# Patient Record
Sex: FEMALE | Race: WHITE | NOT HISPANIC OR LATINO | URBAN - METROPOLITAN AREA
[De-identification: names, ages, dates, MRNs, and addresses within clinical notes are randomized per-mention and may not be internally consistent; named-entity substitution may affect disease eponyms.]

---

## 2017-01-10 ENCOUNTER — GENERIC CONVERSION - ENCOUNTER (OUTPATIENT)
Dept: OTHER | Facility: OTHER | Age: 52
End: 2017-01-10

## 2017-01-19 ENCOUNTER — ALLSCRIPTS OFFICE VISIT (OUTPATIENT)
Dept: OTHER | Facility: OTHER | Age: 52
End: 2017-01-19

## 2017-02-03 ENCOUNTER — GENERIC CONVERSION - ENCOUNTER (OUTPATIENT)
Dept: OTHER | Facility: OTHER | Age: 52
End: 2017-02-03

## 2017-02-24 ENCOUNTER — GENERIC CONVERSION - ENCOUNTER (OUTPATIENT)
Dept: OTHER | Facility: OTHER | Age: 52
End: 2017-02-24

## 2017-02-28 ENCOUNTER — GENERIC CONVERSION - ENCOUNTER (OUTPATIENT)
Dept: OTHER | Facility: OTHER | Age: 52
End: 2017-02-28

## 2017-03-02 ENCOUNTER — GENERIC CONVERSION - ENCOUNTER (OUTPATIENT)
Dept: OTHER | Facility: OTHER | Age: 52
End: 2017-03-02

## 2017-10-05 ENCOUNTER — ALLSCRIPTS OFFICE VISIT (OUTPATIENT)
Dept: OTHER | Facility: OTHER | Age: 52
End: 2017-10-05

## 2017-10-28 NOTE — PROGRESS NOTES
Assessment  1  Abdominal pain, epigastric (789 06) (R10 13)   2  Colorectal polyp detected on colonoscopy (211 3) (K63 5)    Plan  Abdominal pain, epigastric    · Avoid alcoholic beverages ; Status:Complete;   Done: 64HXQ3149   Ordered; For:Abdominal pain, epigastric; Ordered By:Claire Rodriguez;   · Avoid foods that give you gas ; Status:Complete;   Done: 75ZGK4786   Ordered; For:Abdominal pain, epigastric; Ordered By:Claire Rodriguez;   · Eat small frequent meals ; Status:Complete;   Done: 27ICH7612   Ordered;pain, epigastric; Ordered By:Claire Rodriguez;   · Raise the head of your bed to keep stomach acid from coming back up  ;Status:Complete;   Done: 78DRP9862   Ordered;pain, epigastric; Ordered By:Claire Rodriguez;   · Several things can be done to help treat and prevent your gastric reflux  ;Status:Complete;   Done: 28YFU8352   Ordered;pain, epigastric; Ordered By:Claire Rodriguez;   · Follow-up PRN Evaluation and Treatment  Follow-up  Status: Complete  Done:05Oct2017   Ordered; Abdominal pain, epigastric; Ordered By: Angie Fitch Performed:  Due: 22YFJ0577  Colorectal polyp detected on colonoscopy    · COLONOSCOPY (GI, SURG) ; every 5 months; Next 16LPY9979; Status:Active   For: 'Colorectal polyp detected on colonoscopy'Ordered By: Angie Fitch'    Discussion/Summary  Discussion Summary:     Epigastric pain, which appears resolved at this time  She does not seem to be having any significant symptoms of dyspepsia at this point, and has been off of PPI for about a year and a half now; last EGD also showed no remarkable findings  She had a couple of episodes of what she described as gas pain several weeks ago but these appear to have resolved, and I suspect these were dietary related and self-limited; I doubt for peptic ulcer disease at this point  History of adenomatous colon polyps; she had tubular adenomas removed during colonoscopy in 2015      No need to refill PPI at this time, I advised patient to call our office if she experiences any acid reflux, epigastric discomfort, etc   Patient was explained about the lifestyle and dietary modifications  Advised to avoid fatty foods, chocolates, caffeine, alcohol and any other triggering foods  Avoid eating for at least 3 hours before going to bed  Recommend repeat colonoscopy as early as March 2018; I placed reminder in [employer]  Counseling Documentation With Imm: The patient was counseled regarding diagnostic results,-- instructions for management,-- risk factor reductions,-- risks and benefits of treatment options,-- importance of compliance with treatment  Medication SE Review and Pt Understands Tx: Possible side effects of new medications were reviewed with the patient/guardian today  Chief Complaint  Chief Complaint Free Text Note Form: up; epigastric discomfort, reflux, history of colon polyps      History of Present Illness  HPI: female with history of anxiety presents for follow-up; she was seen by Dr Frankey Ina in December 2015 with complaints of epigastric discomfort, she underwent EGD which showed a 1-2 cm hiatal hernia and a few duodenal erosions, but biopsies were benign  Her last colonoscopy in March 2015 also showed some tubular adenomas and she was recommended for colonoscopy in 3 years from that time  says that she has been off of [address] [place] [address] [employer] for about a year and a half now and was doing well, she said that a few weeks ago she had an episode of what she thought was some severe gas pain after eating some corn, which eventually resolved on its own  She actually had 2 such episodes spaced apart by a few weeks, but has not had any recurrences since then  She says her bowel habits are regular, she denies diarrhea, blood or mucus in the stool  No nausea, her appetite is good  Denies any significant acid reflux or heartburn at this time  History Reviewed: The history was obtained today from the patient and I agree with the documented history  Review of Systems  Complete-Female GI Adult:  Constitutional: No fever, no chills, feels well, no tiredness, no recent weight gain or weight loss  Eyes: No complaints of eye pain, no red eyes, no eyesight problems, no discharge, no dry eyes, no itching of eyes  ENT: no complaints of earache, no loss of hearing, no nose bleeds, no nasal discharge, no sore throat, no hoarseness  Cardiovascular: No complaints of slow heart rate, no fast heart rate, no chest pain, no palpitations, no leg claudication, no lower extremity edema  Respiratory: No complaints of shortness of breath, no wheezing, no cough, no SOB on exertion, no orthopnea, no PND  Gastrointestinal: as noted in HPI  Genitourinary: No complaints of dysuria, no incontinence, no pelvic pain, no dysmenorrhea, no vaginal discharge or bleeding  Musculoskeletal: No complaints of arthralgias, no myalgias, no joint swelling or stiffness, no limb pain or swelling  Integumentary: No complaints of skin rash or lesions, no itching, no skin wounds, no breast pain or lump  Neurological: No complaints of headache, no confusion, no convulsions, no numbness, no dizziness or fainting, no tingling, no limb weakness, no difficulty walking  Psychiatric: Not suicidal, no sleep disturbance, no anxiety or depression, no change in personality, no emotional problems  Endocrine: No complaints of proptosis, no hot flashes, no muscle weakness, no deepening of the voice, no feelings of weakness  Hematologic/Lymphatic: No complaints of swollen glands, no swollen glands in the neck, does not bleed easily, does not bruise easily  Active Problems  1  Abdominal pain (789 00) (R10 9)   2  Abdominal pain, epigastric (789 06) (R10 13)   3  Acquired polycythemia (289 0) (D75 1)   4  Acute non-recurrent sinusitis, unspecified location (461 9) (J01 90)   5  Anxiety (300 00) (F41 9)   6  Carpal tunnel syndrome, unspecified laterality (354 0) (G56 00)   7   Colorectal polyp detected on colonoscopy (211 3) (K63 5)   8  Dyspepsia (536 8) (K30)   9  Greater trochanteric bursitis, right (726 5) (M70 61)   10  Hip pain, unspecified laterality   11  Ileus (560 1) (K56 7)   12  Lateral epicondylitis, unspecified laterality (726 32) (M77 10)   13  Nevus (216 9) (D22 9)   14  Pre-op exam (V72 84) (Z01 818)   15  Trigger middle finger of right hand (727 03) (M65 331)   16  Verruca (078 10) (B07 9)    Past Medical History  1  History of Pain of finger, unspecified laterality (729 5) (M79 646)   2  History of Upper respiratory infection (465 9) (J06 9)  Active Problems And Past Medical History Reviewed: The active problems and past medical history were reviewed and updated today  Surgical History  1  Denied: History of Reported Prior Surgical / Procedural History  Surgical History Reviewed: The surgical history was reviewed and updated today  Family History  Mother    1  Denied: Family history of Colon cancer   2  Denied: Family history of Crohn disease   3  Denied: Family history of Liver disease  Father    4  Denied: Family history of Colon cancer   5  Denied: Family history of Crohn disease   6  Denied: Family history of Liver disease  Family History Reviewed: The family history was reviewed and updated today  Social History     · Current every day smoker (305 1) (F17 200)   · Social alcohol use (Z78 9)  Social History Reviewed: The social history was reviewed and updated today  The social history was reviewed and is unchanged  Current Meds   1  Dexilant 60 MG Oral Capsule Delayed Release; TAKE 1 CAPSULE DAILY EVERY MORNING BEFORE BREAKFAST; Therapy: 27JNR3473 to (Evaluate:12Oct2017)  Requested for: 12Sep2017; Last Rx:00Bmi1547 Ordered   2  LORazepam 1 MG Oral Tablet; TAKE 1/2 TO 1 TABLET AS NEEDED FOR ANXIETY; Therapy: 79OQX6159 to (Evaluate:04Oct2017); Last Rx:33Jqp1860 Ordered   3  PredniSONE 10 MG Oral Tablet; 1 PO BID FOR 3 DAYS WITH FOOD;  Therapy: 79MYL0822 to (Evaluate:22Jan2017)  Requested for: 64YLF5246; Last Rx:19Jan2017 Ordered   4  Premarin 0 625 MG/GM Vaginal Cream; Therapy: 53JPV7095 to Recorded  Medication List Reviewed: The medication list was reviewed and updated today  Allergies  1  No Known Drug Allergies    Vitals  Vital Signs    Recorded: 96MYI8711 01:46PM   Temperature 98 1 F   Heart Rate 89   Respiration 14   Systolic 751   Diastolic 78   Height 5 ft 2 in   Weight 142 lb    BMI Calculated 25 97   BSA Calculated 1 65   O2 Saturation 97       Physical Exam   Constitutional  General appearance: No acute distress, well appearing and well nourished  Eyes  Conjunctiva and lids: No swelling, erythema or discharge  Pupils and irises: Equal, round and reactive to light  Ears, Nose, Mouth, and Throat  External inspection of ears and nose: Normal    Oropharynx: Normal with no erythema, edema, exudate or lesions  Pulmonary  Respiratory effort: No increased work of breathing or signs of respiratory distress  Auscultation of lungs: Clear to auscultation  Cardiovascular  Palpation of heart: Normal PMI, no thrills  Auscultation of heart: Normal rate and rhythm, normal S1 and S2, without murmurs  Examination of extremities for edema and/or varicosities: Normal    Carotid pulses: Normal    Abdomen  Abdomen: Non-tender, no masses  Liver and spleen: No hepatomegaly or splenomegaly  Lymphatic  Palpation of lymph nodes in neck: No lymphadenopathy  Musculoskeletal  Gait and station: Normal    Digits and nails: Normal without clubbing or cyanosis  Inspection/palpation of joints, bones, and muscles: Normal    Skin  Skin and subcutaneous tissue: Normal without rashes or lesions  Psychiatric  Orientation to person, place, and time: Normal    Mood and affect: Normal          Health Management  Abdominal pain, epigastric   COLONOSCOPY; every 3 years; Last 94DCE5518; Next Due: [id number];  Active  Colorectal polyp detected on colonoscopy   COLONOSCOPY; every 3 years; Last 63EMW6332; Next Due: [id number];  Active    Signatures   Electronically signed by : Leretha Litten, [facility]; Oct  5 2017  4:12PM EST                       (Author)    Electronically signed by : JALEN Hernandez ; Oct  6 2017  6:39PM EST                       (Author)

## 2017-11-10 ENCOUNTER — ALLSCRIPTS OFFICE VISIT (OUTPATIENT)
Dept: OTHER | Facility: OTHER | Age: 52
End: 2017-11-10

## 2017-11-11 NOTE — PROCEDURES
Chief Complaint  trigger finger left middle finger here for steroid injection  history of right middle finger trigger finger with steroid injection 11/2015      Current Meds   1  LORazepam 1 MG Oral Tablet; TAKE 1/2 TO 1 TABLET AS NEEDED FOR ANXIETY; Therapy: 45HHF0657 to (Evaluate:04Oct2017); Last Rx:70Vzi4560 Ordered   2  Melatonin 5 MG Oral Capsule; Therapy: (Recorded:10Nov2017) to Recorded   3  Premarin 0 625 MG/GM Vaginal Cream; Therapy: 20XVF7291 to Recorded   4  Skelaxin 800 MG Oral Tablet; Therapy: (Recorded:10Nov2017) to Recorded    Allergies  1  No Known Drug Allergies    Vitals  Signs     Temperature: 99 7 F  Heart Rate: 76  Respiration: 16  Systolic: 164  Diastolic: 78  Height: 5 ft 2 in  Weight: 142 lb   BMI Calculated: 25 97  BSA Calculated: 1 65    Procedure    Procedure: Injection of the tendon sheathflexor tendon sheath of the left 3rd finger  Indication: trigger finger  Risk, benefits, alternatives, bleeding risk, infection risk and allergic reaction risk were discussed with the  Verbal consent was obtained prior to the procedure  Preparation: alcohol was used to prep the area  Procedure Note: A 25-gauge was used to inject 5/8-- and-- 1 mL 40mg/mL methylprednisolone  A bandage was applied  Post-Procedure: the patient tolerated the procedure well  Complications: None  Patient instructed to avoid strenuous activity for 1 day(s)  Follow-up in the office as needed  Assessment    1  Trigger middle finger of left hand (727 03) (M65 332)    Plan  Anxiety    · LORazepam 1 MG Oral Tablet; TAKE 1/2 TO 1 TABLET AS NEEDED FORANXIETY  PMH: History of lateral epicondylitis    · Metaxalone 800 MG Oral Tablet; TAKE 1 TABLET 3-4 TIMES DAILY AS NEEDED    Discussion/Summary    Recheck as needed        Signatures   Electronically signed by : JALEN Lee ; Nov 10 2017  2:03PM EST                       (Author)

## 2018-01-13 VITALS
HEIGHT: 62 IN | WEIGHT: 142.4 LBS | RESPIRATION RATE: 16 BRPM | TEMPERATURE: 98.5 F | SYSTOLIC BLOOD PRESSURE: 124 MMHG | HEART RATE: 92 BPM | DIASTOLIC BLOOD PRESSURE: 80 MMHG | BODY MASS INDEX: 26.2 KG/M2

## 2018-01-14 VITALS
HEART RATE: 76 BPM | BODY MASS INDEX: 26.13 KG/M2 | SYSTOLIC BLOOD PRESSURE: 124 MMHG | DIASTOLIC BLOOD PRESSURE: 78 MMHG | HEIGHT: 62 IN | WEIGHT: 142 LBS | RESPIRATION RATE: 16 BRPM | TEMPERATURE: 99.7 F

## 2018-01-14 VITALS
RESPIRATION RATE: 14 BRPM | HEART RATE: 89 BPM | WEIGHT: 142 LBS | HEIGHT: 62 IN | BODY MASS INDEX: 26.13 KG/M2 | SYSTOLIC BLOOD PRESSURE: 122 MMHG | DIASTOLIC BLOOD PRESSURE: 78 MMHG | OXYGEN SATURATION: 97 % | TEMPERATURE: 98.1 F

## 2018-03-12 DIAGNOSIS — F41.9 ANXIETY: Primary | ICD-10-CM

## 2018-03-12 RX ORDER — LORAZEPAM 1 MG/1
.5-1 TABLET ORAL DAILY
COMMUNITY
Start: 2012-10-10 | End: 2018-03-12 | Stop reason: SDUPTHER

## 2018-03-12 RX ORDER — LORAZEPAM 1 MG/1
TABLET ORAL
Qty: 30 TABLET | Refills: 2 | Status: SHIPPED | OUTPATIENT
Start: 2018-03-12 | End: 2018-04-16

## 2018-03-12 NOTE — TELEPHONE ENCOUNTER
Patient requesting refill on Lorazepam 1 mg  1 pill as needed #30  Patient needs to sign a controlled substance letter, Call when ready to

## 2018-04-16 ENCOUNTER — OFFICE VISIT (OUTPATIENT)
Dept: FAMILY MEDICINE CLINIC | Facility: CLINIC | Age: 53
End: 2018-04-16
Payer: COMMERCIAL

## 2018-04-16 VITALS
WEIGHT: 149.2 LBS | SYSTOLIC BLOOD PRESSURE: 110 MMHG | HEART RATE: 70 BPM | TEMPERATURE: 98.4 F | HEIGHT: 62 IN | DIASTOLIC BLOOD PRESSURE: 78 MMHG | BODY MASS INDEX: 27.46 KG/M2

## 2018-04-16 DIAGNOSIS — M79.674 PAIN OF TOE OF RIGHT FOOT: ICD-10-CM

## 2018-04-16 DIAGNOSIS — M54.50 ACUTE LEFT-SIDED LOW BACK PAIN WITHOUT SCIATICA: Primary | ICD-10-CM

## 2018-04-16 PROCEDURE — 99214 OFFICE O/P EST MOD 30 MIN: CPT | Performed by: FAMILY MEDICINE

## 2018-04-16 RX ORDER — DIPHENOXYLATE HYDROCHLORIDE AND ATROPINE SULFATE 2.5; .025 MG/1; MG/1
1 TABLET ORAL
COMMUNITY

## 2018-04-16 RX ORDER — IBUPROFEN 600 MG/1
600 TABLET ORAL EVERY 8 HOURS PRN
Qty: 30 TABLET | Refills: 0 | Status: SHIPPED | OUTPATIENT
Start: 2018-04-16 | End: 2020-07-02 | Stop reason: ALTCHOICE

## 2018-04-16 RX ORDER — ASCORBATE CALCIUM 500 MG
500 TABLET ORAL
COMMUNITY
End: 2020-01-06 | Stop reason: ALTCHOICE

## 2018-04-16 RX ORDER — LORAZEPAM 1 MG/1
1 TABLET ORAL DAILY PRN
COMMUNITY
Start: 2018-01-08 | End: 2018-06-27 | Stop reason: SDUPTHER

## 2018-04-16 RX ORDER — METAXALONE 800 MG/1
1 TABLET ORAL DAILY PRN
COMMUNITY
End: 2018-07-13 | Stop reason: ALTCHOICE

## 2018-04-16 NOTE — PROGRESS NOTES
FAMILY MEDICINE PROGRESS NOTE  Holden Leung 45 y o  female   DATE: April 16, 2018 TIME: 8:23 AM      ASSESSMENT and PLAN:  Holden Leung is a 45 y o  female with:     1  Acute left-sided low back pain without sciatica  Likely musculoskeletal in origin, usually improves with supportive care, encouraged heat to the area, stretches  Has stomach pain with Naproxen, recommended doing H2 blocker with higher dose of Ibuprofen  Mildly positive SLR bilaterally, but likely due to acute inflammation versus disc herniation  If symptoms persist, will get Xray and start PT  - ibuprofen (MOTRIN) 600 mg tablet; Take 1 tablet (600 mg total) by mouth every 8 (eight) hours as needed for mild pain  Dispense: 30 tablet; Refill: 0    2  Pain of toe of right foot  Likely a bone spur versus calcification, not having any pain, reassured, advised that if pain recurs, can get X-ray, but will likely would not need intervention  SUBJECTIVE:  Holden Leung is a 45 y o  female who presents today with a chief complaint of Toe Pain (injury started 2016 when she jumped off a fence  right pinky toe hurt on and off for about a year  now she has no feeling in her toe) and Back Pain (puuled lower back yesterday)  In the fall of 2016, pt was picking apples and jumped off a fence in [place]  It hut at the time and never sought treatment, and on and  Then 3-4 months ago she started noticing a lump on the side of the toe, then 2-3 months ago it started feeling like a rubber band around the toe and now she cant move it at all  Denies numbness/tingling  Not really having pain in the toe, mostly on the side of the toe  Also, yesterday she thinks she pulled a muscle in her lower back, more on the left side and right after she was sitting on the toilet and tried to grab something and pulled a muscle she thinks  Usually clears up if she takes the Skelaxin      Toe Pain    The incident occurred more than 1 week ago   The injury mechanism was a fall  The pain is present in the right toes  Associated symptoms include a loss of motion  Pertinent negatives include no inability to bear weight, loss of sensation, muscle weakness, numbness or tingling  Nothing aggravates the symptoms  The treatment provided no relief  Back Pain   Pertinent negatives include no numbness or tingling  Review of Systems   Musculoskeletal: Positive for back pain  Neurological: Negative for tingling and numbness  I have reviewed the patient's PMH, Social History, Medication List and Allergies  OBJECTIVE:  /78 (BP Location: Left arm, Patient Position: Sitting)   Pulse 70   Temp 98 4 °F (36 9 °C)   Ht 5' 2" (1 575 m)   Wt 67 7 kg (149 lb 3 2 oz)   BMI 27 29 kg/m²   Physical Exam   Constitutional: She appears well-developed and well-nourished  No distress  Musculoskeletal:        Lumbar back: She exhibits tenderness and pain  She exhibits normal range of motion, no bony tenderness, no swelling, no edema, no deformity, no laceration, no spasm and normal pulse  Back:         Feet:    Mildly positive SLR test bilaterally   Skin: She is not diaphoretic  Levy Herrera MD

## 2018-06-27 DIAGNOSIS — F41.1 GAD (GENERALIZED ANXIETY DISORDER): Primary | ICD-10-CM

## 2018-06-28 RX ORDER — LORAZEPAM 1 MG/1
1 TABLET ORAL DAILY PRN
Qty: 30 TABLET | Refills: 2 | Status: SHIPPED | OUTPATIENT
Start: 2018-06-28 | End: 2018-07-05 | Stop reason: SDUPTHER

## 2018-07-05 ENCOUNTER — TELEPHONE (OUTPATIENT)
Dept: FAMILY MEDICINE CLINIC | Facility: CLINIC | Age: 53
End: 2018-07-05

## 2018-07-05 ENCOUNTER — OFFICE VISIT (OUTPATIENT)
Dept: FAMILY MEDICINE CLINIC | Facility: CLINIC | Age: 53
End: 2018-07-05
Payer: COMMERCIAL

## 2018-07-05 VITALS
WEIGHT: 151.2 LBS | BODY MASS INDEX: 28.55 KG/M2 | TEMPERATURE: 97.6 F | HEART RATE: 100 BPM | RESPIRATION RATE: 16 BRPM | SYSTOLIC BLOOD PRESSURE: 138 MMHG | DIASTOLIC BLOOD PRESSURE: 84 MMHG | HEIGHT: 61 IN

## 2018-07-05 DIAGNOSIS — F41.1 GAD (GENERALIZED ANXIETY DISORDER): ICD-10-CM

## 2018-07-05 DIAGNOSIS — Z00.00 WELL ADULT EXAM: Primary | ICD-10-CM

## 2018-07-05 DIAGNOSIS — M79.18 MYOFASCIAL PAIN SYNDROME: ICD-10-CM

## 2018-07-05 DIAGNOSIS — M65.332 TRIGGER MIDDLE FINGER OF LEFT HAND: ICD-10-CM

## 2018-07-05 PROCEDURE — 99396 PREV VISIT EST AGE 40-64: CPT | Performed by: FAMILY MEDICINE

## 2018-07-05 PROCEDURE — 20550 NJX 1 TENDON SHEATH/LIGAMENT: CPT | Performed by: FAMILY MEDICINE

## 2018-07-05 RX ORDER — LORAZEPAM 1 MG/1
1 TABLET ORAL DAILY PRN
Qty: 30 TABLET | Refills: 2 | Status: SHIPPED | OUTPATIENT
Start: 2018-07-05 | End: 2018-11-13 | Stop reason: SDUPTHER

## 2018-07-05 RX ORDER — METHYLPREDNISOLONE ACETATE 80 MG/ML
40 INJECTION, SUSPENSION INTRA-ARTICULAR; INTRALESIONAL; INTRAMUSCULAR; SOFT TISSUE ONCE
Status: COMPLETED | OUTPATIENT
Start: 2018-07-05 | End: 2018-07-05

## 2018-07-05 RX ADMIN — METHYLPREDNISOLONE ACETATE 40 MG: 80 INJECTION, SUSPENSION INTRA-ARTICULAR; INTRALESIONAL; INTRAMUSCULAR; SOFT TISSUE at 17:12

## 2018-07-05 NOTE — TELEPHONE ENCOUNTER
Received fax from [facility] stating that patient needed a prior-auth for Metaxalone  Patient was given initial script for medication on 8/17/2017 with 1 refill  She called in for refill and has changed insurance and now requires prior-auth  Arleth Schwartz

## 2018-07-05 NOTE — PROGRESS NOTES
Assessment/Plan:     Diagnoses and all orders for this visit:    Well adult exam    Trigger middle finger of left hand  -     methylPREDNISolone acetate (DEPO-MEDROL) injection 40 mg; 0 5 mL (40 mg total) by Intra-lesional route once     Myofascial pain syndrome  -     Discontinue: diclofenac sodium (VOLTAREN) 1 %; Apply 2 g topically 4 (four) times a day  -     diclofenac sodium (VOLTAREN) 1 %; Apply 2 g topically 4 (four) times a day    TEQUILA (generalized anxiety disorder)  -     LORazepam (ATIVAN) 1 mg tablet; Take 1 tablet (1 mg total) by mouth daily as needed for anxiety        Continue with current medications  She is not interested in labs  As a separate procedure today I performed a trigger finger injection see procedure note  Patient ID: Rahul Greene is a 45 y o  female  45year old female here for wellness visit  Active problems and PMH see chart  Medications reviewed  No recent labs  yearly mammogram  Pap smear 02/2017  She is scheduled for colonoscopy  Smoker < 1/2 PPD  She walks on a regular basis  The following portions of the patient's history were reviewed and updated as appropriate: allergies, current medications, past family history, past medical history, past social history, past surgical history and problem list     Review of Systems   Constitutional: Negative for appetite change, chills, fatigue, fever and unexpected weight change  HENT: Negative for congestion, ear pain, rhinorrhea, sore throat, trouble swallowing and voice change  Eyes: Negative for visual disturbance  Respiratory: Negative for cough, shortness of breath and wheezing  Cardiovascular: Negative for chest pain, palpitations and leg swelling  EKG 01/2017   Gastrointestinal: Negative for abdominal pain, blood in stool, constipation, diarrhea, nausea and vomiting         02/2015 EGD [facility]  03/2015 colonoscopy benign polyps  Genitourinary: Negative for difficulty urinating     Musculoskeletal: Positive for back pain and myalgias  Negative for joint swelling  Recurrent upper and lower back pain  She has been using prn Voltaren Gel and Skelaxin with improvement  No relief with OTC NSAIDs  Symptomatic trigger finger left middle finger  Skin: Negative for rash  Neurological: Negative for dizziness and headaches  Hematological: Negative for adenopathy  Does not bruise/bleed easily  Psychiatric/Behavioral: Negative for dysphoric mood and sleep disturbance  Objective:      /84 (BP Location: Left arm, Patient Position: Sitting, Cuff Size: Large)   Pulse 100   Temp 97 6 °F (36 4 °C) (Tympanic)   Resp 16   Ht 5' 1 25" (1 556 m)   Wt 68 6 kg (151 lb 3 2 oz)   BMI 28 34 kg/m²          Physical Exam   Constitutional: She is oriented to person, place, and time  She appears well-developed and well-nourished  HENT:   Right Ear: Tympanic membrane normal    Left Ear: Tympanic membrane normal    Mouth/Throat: Oropharynx is clear and moist and mucous membranes are normal  No oral lesions  Normal dentition  Eyes: Conjunctivae and EOM are normal  Pupils are equal, round, and reactive to light  No scleral icterus  Neck: No JVD present  No tracheal deviation present  No thyroid mass and no thyromegaly present  Cardiovascular: Normal rate, regular rhythm and normal heart sounds  Exam reveals no gallop  No murmur heard  Pulmonary/Chest: Effort normal and breath sounds normal  No respiratory distress  She has no wheezes  She has no rales  Abdominal: Soft  Bowel sounds are normal  She exhibits no distension and no mass  There is no tenderness  There is no rebound and no guarding  Musculoskeletal: Normal range of motion  She exhibits no edema  Tenderness base of left middle finger on palmar surface  Lymphadenopathy:     She has no cervical adenopathy  Neurological: She is alert and oriented to person, place, and time  No cranial nerve deficit  Skin: No rash noted  Psychiatric: She has a normal mood and affect  Nursing note and vitals reviewed  Procedure Trigger finger injection left middle finger  Using aseptic technique a trigger finger steroid injection was performed  left middle finger palmar surface at base of middle finger just below left DIP joint  DepoMedrol 80 mg/ML 0 5 ML       patient tolerated procedure  No complications

## 2018-07-06 ENCOUNTER — TELEPHONE (OUTPATIENT)
Dept: FAMILY MEDICINE CLINIC | Facility: CLINIC | Age: 53
End: 2018-07-06

## 2018-07-11 ENCOUNTER — TELEPHONE (OUTPATIENT)
Dept: FAMILY MEDICINE CLINIC | Facility: CLINIC | Age: 53
End: 2018-07-11

## 2018-07-11 NOTE — TELEPHONE ENCOUNTER
[facility] Metaxalone 800 mg needs prior auth   [phone] [id number]

## 2018-07-12 NOTE — TELEPHONE ENCOUNTER
[facility] denied request to continue covering skelaxin  They want documentation that pt has tried PT  Or other measures for her pain

## 2018-07-13 DIAGNOSIS — M79.18 MYOFASCIAL PAIN SYNDROME: Primary | ICD-10-CM

## 2018-07-13 RX ORDER — CYCLOBENZAPRINE HCL 10 MG
TABLET ORAL
Qty: 30 TABLET | Refills: 2 | Status: SHIPPED | OUTPATIENT
Start: 2018-07-13 | End: 2020-01-06 | Stop reason: ALTCHOICE

## 2018-11-13 DIAGNOSIS — F41.1 GAD (GENERALIZED ANXIETY DISORDER): ICD-10-CM

## 2018-11-14 RX ORDER — LORAZEPAM 1 MG/1
1 TABLET ORAL DAILY PRN
Qty: 30 TABLET | Refills: 2 | Status: SHIPPED | OUTPATIENT
Start: 2018-11-14 | End: 2019-02-28 | Stop reason: SDUPTHER

## 2018-12-11 ENCOUNTER — OFFICE VISIT (OUTPATIENT)
Dept: FAMILY MEDICINE CLINIC | Facility: CLINIC | Age: 53
End: 2018-12-11
Payer: COMMERCIAL

## 2018-12-11 VITALS
WEIGHT: 151 LBS | TEMPERATURE: 98.5 F | SYSTOLIC BLOOD PRESSURE: 148 MMHG | BODY MASS INDEX: 27.79 KG/M2 | HEIGHT: 62 IN | DIASTOLIC BLOOD PRESSURE: 82 MMHG

## 2018-12-11 DIAGNOSIS — M77.01 MEDIAL EPICONDYLITIS OF RIGHT ELBOW: Primary | ICD-10-CM

## 2018-12-11 DIAGNOSIS — G56.01 CARPAL TUNNEL SYNDROME OF RIGHT WRIST: ICD-10-CM

## 2018-12-11 PROCEDURE — 3008F BODY MASS INDEX DOCD: CPT | Performed by: FAMILY MEDICINE

## 2018-12-11 PROCEDURE — 20550 NJX 1 TENDON SHEATH/LIGAMENT: CPT | Performed by: FAMILY MEDICINE

## 2018-12-11 PROCEDURE — 99213 OFFICE O/P EST LOW 20 MIN: CPT | Performed by: FAMILY MEDICINE

## 2018-12-11 RX ORDER — METHYLPREDNISOLONE ACETATE 80 MG/ML
40 INJECTION, SUSPENSION INTRA-ARTICULAR; INTRALESIONAL; INTRAMUSCULAR; SOFT TISSUE ONCE
Status: COMPLETED | OUTPATIENT
Start: 2018-12-11 | End: 2018-12-11

## 2018-12-11 RX ADMIN — METHYLPREDNISOLONE ACETATE 40 MG: 80 INJECTION, SUSPENSION INTRA-ARTICULAR; INTRALESIONAL; INTRAMUSCULAR; SOFT TISSUE at 17:02

## 2018-12-11 NOTE — PROGRESS NOTES
Assessment/Plan:     Diagnoses and all orders for this visit:    Medial epicondylitis of right elbow  -     methylPREDNISolone acetate (DEPO-MEDROL) injection 40 mg; Inject 0 5 mL (40 mg total) into the joint once     Carpal tunnel syndrome of right wrist        Suspected CTS right wrist  Trial of wrist splint at   Consider EMGs for persistent symptoms  As a separate procedure today I performed a tendon sheath injection right medial epicondyle area see procedure note  Patient ID: Rhina Mancia is a 52 y o  female  Recurrent right medial elbow pain  Right handed  No trauma or injury  No improvement with Advil, CBD oil and TENS unit  No other joint symptoms  Current medications reviewed  The following portions of the patient's history were reviewed and updated as appropriate: allergies, current medications, past family history, past medical history, past social history, past surgical history and problem list     Review of Systems   Constitutional: Negative for chills and fever  Musculoskeletal: Negative for neck pain  See HPI   Skin: Negative for rash  Allergic/Immunologic:        Intermittent numbness right hand involving palm right  hand worse with certain positions  Neurological: Positive for numbness  Negative for dizziness, weakness and headaches  Objective:      /82 (BP Location: Left arm, Patient Position: Sitting, Cuff Size: Standard)   Temp 98 5 °F (36 9 °C)   Ht 5' 2" (1 575 m)   Wt 68 5 kg (151 lb)   BMI 27 62 kg/m²          Physical Exam   Constitutional: No distress  Musculoskeletal:        Right elbow: She exhibits normal range of motion, no swelling, no effusion and no deformity  Tenderness found  Medial epicondyle tenderness noted  No radial head, no lateral epicondyle and no olecranon process tenderness noted  Neurological: She has normal reflexes  No sensory deficit  +/- Tinel's sign on right  No muscle wasting   Right hand  normal  Procedure note steroid injection tendon sheath    Indication right medial epicondylitis    Using aseptic technique right medial epicondyle injected with DepoMedrol 80 mg/ML 0 5 ML and Lidocaine 1% 1 ML  Patient tolerated procedure  No complications

## 2019-02-27 ENCOUNTER — TELEPHONE (OUTPATIENT)
Dept: FAMILY MEDICINE CLINIC | Facility: CLINIC | Age: 54
End: 2019-02-27

## 2019-02-27 DIAGNOSIS — G89.29 CHRONIC LEFT SHOULDER PAIN: Primary | ICD-10-CM

## 2019-02-27 DIAGNOSIS — M25.512 CHRONIC LEFT SHOULDER PAIN: Primary | ICD-10-CM

## 2019-02-27 DIAGNOSIS — F41.1 GAD (GENERALIZED ANXIETY DISORDER): ICD-10-CM

## 2019-02-27 RX ORDER — LORAZEPAM 1 MG/1
1 TABLET ORAL DAILY PRN
Qty: 30 TABLET | Refills: 2 | Status: CANCELLED | OUTPATIENT
Start: 2019-02-27

## 2019-02-27 RX ORDER — METAXALONE 800 MG/1
800 TABLET ORAL 3 TIMES DAILY PRN
Qty: 30 TABLET | Refills: 0 | Status: SHIPPED | OUTPATIENT
Start: 2019-02-27 | End: 2022-02-07

## 2019-02-27 NOTE — TELEPHONE ENCOUNTER
Patient requesting Lorazepam 1 mg tablet, 1 pill daily sent to [facility]  Last office visit 12/11/18  [place] PDMP website checked, last fill on 01/30/19

## 2019-02-28 DIAGNOSIS — F41.1 GAD (GENERALIZED ANXIETY DISORDER): ICD-10-CM

## 2019-02-28 RX ORDER — LORAZEPAM 1 MG/1
1 TABLET ORAL DAILY PRN
Qty: 30 TABLET | Refills: 2 | Status: SHIPPED | OUTPATIENT
Start: 2019-02-28 | End: 2019-08-27 | Stop reason: SDUPTHER

## 2019-05-22 ENCOUNTER — OFFICE VISIT (OUTPATIENT)
Dept: FAMILY MEDICINE CLINIC | Facility: CLINIC | Age: 54
End: 2019-05-22
Payer: COMMERCIAL

## 2019-05-22 VITALS
HEIGHT: 62 IN | WEIGHT: 148 LBS | HEART RATE: 68 BPM | RESPIRATION RATE: 16 BRPM | TEMPERATURE: 97.2 F | BODY MASS INDEX: 27.23 KG/M2 | SYSTOLIC BLOOD PRESSURE: 142 MMHG | DIASTOLIC BLOOD PRESSURE: 82 MMHG

## 2019-05-22 DIAGNOSIS — M77.01 MEDIAL EPICONDYLITIS OF ELBOW, RIGHT: Primary | ICD-10-CM

## 2019-05-22 DIAGNOSIS — M65.352 TRIGGER LITTLE FINGER OF LEFT HAND: ICD-10-CM

## 2019-05-22 PROCEDURE — 20550 NJX 1 TENDON SHEATH/LIGAMENT: CPT | Performed by: FAMILY MEDICINE

## 2019-05-22 RX ORDER — METHYLPREDNISOLONE ACETATE 80 MG/ML
40 INJECTION, SUSPENSION INTRA-ARTICULAR; INTRALESIONAL; INTRAMUSCULAR; SOFT TISSUE ONCE
Status: COMPLETED | OUTPATIENT
Start: 2019-05-22 | End: 2019-05-22

## 2019-05-22 RX ORDER — METHYLPREDNISOLONE ACETATE 80 MG/ML
80 INJECTION, SUSPENSION INTRA-ARTICULAR; INTRALESIONAL; INTRAMUSCULAR; SOFT TISSUE ONCE
Status: COMPLETED | OUTPATIENT
Start: 2019-05-22 | End: 2019-05-22

## 2019-05-22 RX ADMIN — METHYLPREDNISOLONE ACETATE 40 MG: 80 INJECTION, SUSPENSION INTRA-ARTICULAR; INTRALESIONAL; INTRAMUSCULAR; SOFT TISSUE at 16:18

## 2019-05-22 RX ADMIN — METHYLPREDNISOLONE ACETATE 80 MG: 80 INJECTION, SUSPENSION INTRA-ARTICULAR; INTRALESIONAL; INTRAMUSCULAR; SOFT TISSUE at 16:19

## 2019-08-27 DIAGNOSIS — F41.1 GAD (GENERALIZED ANXIETY DISORDER): ICD-10-CM

## 2019-08-27 RX ORDER — LORAZEPAM 1 MG/1
1 TABLET ORAL DAILY PRN
Qty: 30 TABLET | Refills: 2 | Status: SHIPPED | OUTPATIENT
Start: 2019-08-27 | End: 2019-11-19 | Stop reason: SDUPTHER

## 2019-08-27 NOTE — TELEPHONE ENCOUNTER
Refill request for Lorazepam 1 mg, 1 tab as needed for anxiety  [facility]

## 2019-11-19 DIAGNOSIS — F41.1 GAD (GENERALIZED ANXIETY DISORDER): ICD-10-CM

## 2019-11-19 RX ORDER — LORAZEPAM 1 MG/1
1 TABLET ORAL DAILY PRN
Qty: 30 TABLET | Refills: 2 | Status: SHIPPED | OUTPATIENT
Start: 2019-11-26 | End: 2020-03-09 | Stop reason: SDUPTHER

## 2019-11-19 NOTE — TELEPHONE ENCOUNTER
Patient called requesting refill on Lorazepam 1 mg 30 tabs with refills  [facility]  Patient needs Controlled Substance letter signed   Last Office visit 05-

## 2020-01-06 ENCOUNTER — OFFICE VISIT (OUTPATIENT)
Dept: FAMILY MEDICINE CLINIC | Facility: CLINIC | Age: 55
End: 2020-01-06
Payer: COMMERCIAL

## 2020-01-06 VITALS
DIASTOLIC BLOOD PRESSURE: 70 MMHG | TEMPERATURE: 98.1 F | BODY MASS INDEX: 23.07 KG/M2 | HEART RATE: 72 BPM | WEIGHT: 147 LBS | RESPIRATION RATE: 16 BRPM | SYSTOLIC BLOOD PRESSURE: 118 MMHG | HEIGHT: 67 IN

## 2020-01-06 DIAGNOSIS — N81.4 UTEROVAGINAL PROLAPSE: ICD-10-CM

## 2020-01-06 DIAGNOSIS — N81.11 MIDLINE CYSTOCELE: ICD-10-CM

## 2020-01-06 DIAGNOSIS — Z72.0 TOBACCO USE: ICD-10-CM

## 2020-01-06 DIAGNOSIS — D75.1 ACQUIRED POLYCYTHEMIA: ICD-10-CM

## 2020-01-06 DIAGNOSIS — Z01.818 PREOPERATIVE CLEARANCE: Primary | ICD-10-CM

## 2020-01-06 PROBLEM — N81.6 RECTOCELE: Status: ACTIVE | Noted: 2019-08-16

## 2020-01-06 PROCEDURE — 99214 OFFICE O/P EST MOD 30 MIN: CPT | Performed by: FAMILY MEDICINE

## 2020-01-06 PROCEDURE — 3008F BODY MASS INDEX DOCD: CPT | Performed by: FAMILY MEDICINE

## 2020-01-06 NOTE — PROGRESS NOTES
PRE-OPERATIVE EVALUATION  [facility]    NAME: Dominga Rose  AGE: 62 y o  SEX: female  : 1965     DATE: 2020    Family Medicine Pre-Operative Evaluation      Chief Complaint: Uterine prolapse  Surgery: vaginal hysterectomy, bilateral salpingectomy, A/P repair and uterosacral ligament suspension  Anticipated Date of Surgery: 2020  Referring Provider:      History of Present Illness: Dmoinga Rose is a 62 y o  female who presents to the office today for a preoperative consultation at the request of the surgeon for the procedure above  Current anti-platelet/anti-coagulation medications that the patient is prescribed includes: none  Assessment of Cardiac Risk:  · Denies unstable or severe angina or MI in the last 6 weeks or history of stent placement in the last year   · Denies decompensated heart failure (e g  New onset heart failure, NYHA functional class IV heart failure, or worsening existing heart failure)  · Denies significant arrhythmias such as high grade AV block, symptomatic ventricular arrhythmia, newly recognized ventricular tachycardia, supraventricular tachycardia with resting heart rate >100, or symptomatic bradycardia  · Denies severe heart valve disease including aortic stenosis or symptomatic mitral stenosis     Exercise Capacity:  · Able to walk 4 blocks without symptoms?: Yes,     · Able to walk 2 flights without symptoms?: Yes    Prior Anesthesia Reactions: No     Personal history of venous thromboembolic disease? No    Review of Systems:     Review of Systems   Constitutional: Negative for appetite change, chills, fever and unexpected weight change  HENT: Negative for congestion, rhinorrhea, sore throat and trouble swallowing  Respiratory: Negative for cough, shortness of breath and wheezing  Smoker < 1 PPD   Cardiovascular: Negative for chest pain, palpitations and leg swelling  Pre admission EKG NSR  No acute changes  Gastrointestinal: Negative for abdominal pain, blood in stool, constipation, diarrhea, nausea, rectal pain and vomiting  Genitourinary: Negative for difficulty urinating, dysuria and hematuria  See HPI  Uterine prolapse with prior pessary use  + urinary frequency and urgency  Occasional urinary incontinence  Pre admission urine culture normal  Pap smear 03/2019    Hematological: Does not bruise/bleed easily  Pre admission CBC WBC 7700  Hemoglobin 14 9  MCV 99  Platelet count 098117   Psychiatric/Behavioral: Negative for dysphoric mood  The patient is nervous/anxious  Patient uses p r n   Ativan     Current Problem List:     Patient Active Problem List   Diagnosis    Acquired polycythemia    Anxiety    Colorectal polyp detected on colonoscopy    Nevus    Midline cystocele    Rectocele    Uterovaginal prolapse     Allergies:     No Known Allergies  Medications:       Current Outpatient Medications:     cannabidiol (EPIDIOLEX) 100 mg/ml SOLN, Take by mouth 2 (two) times a day, Disp: , Rfl:     Cholecalciferol 1000 units capsule, Take 1,000 Units by mouth, Disp: , Rfl:     conjugated estrogens (PREMARIN) vaginal cream, Insert into the vagina, Disp: , Rfl:     diclofenac sodium (VOLTAREN) 1 %, Apply 2 g topically 4 (four) times a day, Disp: 100 g, Rfl: 3    ibuprofen (MOTRIN) 600 mg tablet, Take 1 tablet (600 mg total) by mouth every 8 (eight) hours as needed for mild pain, Disp: 30 tablet, Rfl: 0    LORazepam (ATIVAN) 1 mg tablet, Take 1 tablet (1 mg total) by mouth daily as needed for anxiety, Disp: 30 tablet, Rfl: 2    Melatonin 5 MG CAPS, Take by mouth, Disp: , Rfl:     metaxalone (SKELAXIN) 800 mg tablet, Take 1 tablet (800 mg total) by mouth 3 (three) times a day as needed for muscle spasms for up to 10 days, Disp: 30 tablet, Rfl: 0    multivitamin (THERAGRAN) TABS, Take 1 tablet by mouth, Disp: , Rfl:   Past Medical History:       Past Medical History:   Diagnosis Date    Carpal tunnel syndrome on right     Last assessed 01/29/14    Ileus ([address] ) 2/14/2015    Lateral epicondylitis     Left- Last assessed 01/29/14        No past surgical history on file       Family History   Problem Relation Age of Onset    Lung cancer Mother     Depression Father         Social History     Socioeconomic History    Marital status: /Civil Union     Spouse name: Not on file    Number of children: Not on file    Years of education: Not on file    Highest education level: Not on file   Occupational History    Not on file   Social Needs    Financial resource strain: Not on file    Food insecurity:     Worry: Not on file     Inability: Not on file    Transportation needs:     Medical: Not on file     Non-medical: Not on file   Tobacco Use    Smoking status: Current Every Day Smoker     Packs/day: 0 25    Smokeless tobacco: Never Used    Tobacco comment: 7-10 daily   Substance and Sexual Activity    Alcohol use: Yes     Comment: Social    Drug use: No    Sexual activity: Not on file   Lifestyle    Physical activity:     Days per week: Not on file     Minutes per session: Not on file    Stress: Not on file   Relationships    Social connections:     Talks on phone: Not on file     Gets together: Not on file     Attends Zoroastrian service: Not on file     Active member of club or organization: Not on file     Attends meetings of clubs or organizations: Not on file     Relationship status: Not on file    Intimate partner violence:     Fear of current or ex partner: Not on file     Emotionally abused: Not on file     Physically abused: Not on file     Forced sexual activity: Not on file   Other Topics Concern    Not on file   Social History Narrative    Not on file      Physical Exam:     /70   Pulse 72   Temp 98 1 °F (36 7 °C)   Resp 16   Ht 5' 7 2" (1 707 m)   Wt 66 7 kg (147 lb)   BMI 22 89 kg/m²     Physical Exam   Constitutional: She is oriented to person, place, and time  She appears well-developed and well-nourished  No distress  HENT:   Mouth/Throat: Oropharynx is clear and moist and mucous membranes are normal  No oral lesions  Normal dentition  Eyes: Pupils are equal, round, and reactive to light  Conjunctivae and EOM are normal  No scleral icterus  Neck: No JVD present  Carotid bruit is not present  No tracheal deviation present  No thyroid mass and no thyromegaly present  Cardiovascular: Normal rate, regular rhythm and normal heart sounds  Exam reveals no gallop  No murmur heard  Pulmonary/Chest: Effort normal and breath sounds normal  No respiratory distress  She has no wheezes  She has no rales  Abdominal: Soft  Bowel sounds are normal  She exhibits no distension, no abdominal bruit and no mass  There is no hepatosplenomegaly  There is no tenderness  There is no rebound and no guarding  Musculoskeletal: She exhibits no edema  Lymphadenopathy:     She has no cervical adenopathy  Neurological: She is alert and oriented to person, place, and time  No cranial nerve deficit  Skin: No rash noted  No cyanosis  Nails show no clubbing  Psychiatric: She has a normal mood and affect  Nursing note and vitals reviewed  Data:     Pre-operative work-up    Laboratory Results: I have personally reviewed the pertinent laboratory results/reports   Lab Results   Component Value Date    CREATININE 0 55 (L) 01/08/2015       EKG: I have personally reviewed pertinent reports  Assessment & Recommendations:     Pre-Op Evaluation Assessment  Cardiac Risk Estimation: per the Revised Cardiac Risk Index (Circ  100:1043, 1999), the patient's risk factors for cardiac complications include none, putting her in: RCI RISK CLASS I (0 risk factors, risk of major cardiac compl  appr  0 5%)  Sejal Elliott is undergoing an elective Low Risk surgery  They are at [address] for major adverse cardiac event (MACE)    They may proceed with surgery as planned without further workup  Pre-Op Evaluation Plan  1  Further preoperative workup as follows:   - None; no further preoperative work-up is required    2  Medication Management/Recommendations:   - Not applicable, not on any medications  - Patient has been instructed to avoid herbs or non-directed vitamins the week prior to surgery to ensure no drug interactions with perioperative surgical and anesthetic medications  3  Patient requires further consultation with: None    4  Assessment of Chronic Conditions: [facility] was seen today for pre-op exam     Diagnoses and all orders for this visit:    Preoperative clearance    Uterovaginal prolapse    Midline cystocele    Acquired polycythemia    Tobacco use      Patient encouraged to quit smoking  Offered flu vaccine she decline    No NSAIDs or aspirin 1 week prior to procedure       Finesse Laguerre MD  [facility]  [address] [place] [id number]  Phone#  [phone]  Fax#  [phone]

## 2020-01-07 ENCOUNTER — TELEPHONE (OUTPATIENT)
Dept: FAMILY MEDICINE CLINIC | Facility: CLINIC | Age: 55
End: 2020-01-07

## 2020-01-07 ENCOUNTER — VBI (OUTPATIENT)
Dept: FAMILY MEDICINE CLINIC | Facility: CLINIC | Age: 55
End: 2020-01-07

## 2020-01-07 NOTE — TELEPHONE ENCOUNTER
Sent to care gap team----- Message from Steph Lehman MD sent at 1/6/2020  5:37 PM EST -----  In Care Everywhere mammogram 02/2019   Pap smear 03/2019

## 2020-01-07 NOTE — LETTER
Procedure Request Form: Mammogram      Date Requested: 20  Patient: Roxana Stack  Patient : 1965   Referring Provider: Shashank Ann, MD  [address] [facility] [phone] [facility] [phone]      Date of Procedure ____19 or most recent mammogram report______________       The above patient has informed us that they have completed their   most recent Mammogram at your facility  Please complete   this form and attach all corresponding procedure reports/results  Comments __________________________________________________________  ____________________________________________________________________  ____________________________________________________________________  ____________________________________________________________________    Facility Completing Procedure _________________________________________    Form Completed By (print name) _______________________________________      Signature __________________________________________________________      These reports are needed for  compliance    Please fax this completed form and a copy of the procedure report to our office as soon as possible to [phone] buddy Regalado: Phone [phone]    We thank you for your assistance in treating our mutual patient

## 2020-01-15 ENCOUNTER — TRANSITIONAL CARE MANAGEMENT (OUTPATIENT)
Dept: FAMILY MEDICINE CLINIC | Facility: CLINIC | Age: 55
End: 2020-01-15

## 2020-01-15 DIAGNOSIS — F41.8 DEPRESSION WITH ANXIETY: Primary | ICD-10-CM

## 2020-01-15 RX ORDER — OXYCODONE HYDROCHLORIDE 5 MG/1
5 TABLET ORAL EVERY 6 HOURS PRN
COMMUNITY
Start: 2020-01-14 | End: 2020-01-24

## 2020-01-15 RX ORDER — DOCUSATE SODIUM 100 MG/1
100 CAPSULE, LIQUID FILLED ORAL 2 TIMES DAILY
COMMUNITY
Start: 2020-01-14 | End: 2022-02-07

## 2020-01-15 RX ORDER — BUPROPION HYDROCHLORIDE 75 MG/1
75 TABLET ORAL 2 TIMES DAILY
COMMUNITY
End: 2020-01-15 | Stop reason: SDUPTHER

## 2020-01-15 RX ORDER — BUPROPION HYDROCHLORIDE 75 MG/1
75 TABLET ORAL 2 TIMES DAILY
Qty: 60 TABLET | Refills: 1 | Status: SHIPPED | OUTPATIENT
Start: 2020-01-15 | End: 2020-03-13

## 2020-01-15 NOTE — TELEPHONE ENCOUNTER
Patient is requesting Wellbutrin tablets  She states that she hasn't smoked in 2 days because of hospitalization and would like to continue smoking cessation  They had her on the patch in the hospital  I prepped medication for you  Please advise  She is on Oxycodone from her surgery

## 2020-03-09 DIAGNOSIS — F41.1 GAD (GENERALIZED ANXIETY DISORDER): ICD-10-CM

## 2020-03-09 RX ORDER — LORAZEPAM 1 MG/1
1 TABLET ORAL DAILY PRN
Qty: 30 TABLET | Refills: 2 | Status: SHIPPED | OUTPATIENT
Start: 2020-03-09 | End: 2020-06-08

## 2020-03-09 NOTE — TELEPHONE ENCOUNTER
02/10/2020  1  11/19/2019  LORAZEPAM 1 MG TABLET  30 0  30  JA MAR  [id number]  GIANT ([id number])  2   Comm Ins  SOPHIA KEE accessed   Rx sent to MD for approval

## 2020-03-13 DIAGNOSIS — F41.8 DEPRESSION WITH ANXIETY: ICD-10-CM

## 2020-03-13 RX ORDER — BUPROPION HYDROCHLORIDE 75 MG/1
TABLET ORAL
Qty: 60 TABLET | Refills: 1 | Status: SHIPPED | OUTPATIENT
Start: 2020-03-13 | End: 2020-05-14 | Stop reason: SDUPTHER

## 2020-05-14 DIAGNOSIS — F41.8 DEPRESSION WITH ANXIETY: ICD-10-CM

## 2020-05-14 RX ORDER — BUPROPION HYDROCHLORIDE 75 MG/1
75 TABLET ORAL 2 TIMES DAILY
Qty: 180 TABLET | Refills: 1 | Status: SHIPPED | OUTPATIENT
Start: 2020-05-14 | End: 2020-07-28 | Stop reason: DRUGHIGH

## 2020-06-08 DIAGNOSIS — F41.1 GAD (GENERALIZED ANXIETY DISORDER): ICD-10-CM

## 2020-06-08 RX ORDER — LORAZEPAM 1 MG/1
TABLET ORAL
Qty: 30 TABLET | Refills: 2 | Status: SHIPPED | OUTPATIENT
Start: 2020-06-08 | End: 2020-09-21 | Stop reason: SDUPTHER

## 2020-06-25 ENCOUNTER — TELEPHONE (OUTPATIENT)
Dept: FAMILY MEDICINE CLINIC | Facility: CLINIC | Age: 55
End: 2020-06-25

## 2020-07-02 ENCOUNTER — OFFICE VISIT (OUTPATIENT)
Dept: FAMILY MEDICINE CLINIC | Facility: CLINIC | Age: 55
End: 2020-07-02
Payer: COMMERCIAL

## 2020-07-02 VITALS
WEIGHT: 145 LBS | DIASTOLIC BLOOD PRESSURE: 78 MMHG | SYSTOLIC BLOOD PRESSURE: 118 MMHG | HEIGHT: 62 IN | TEMPERATURE: 99.4 F | RESPIRATION RATE: 18 BRPM | OXYGEN SATURATION: 96 % | HEART RATE: 90 BPM | BODY MASS INDEX: 26.68 KG/M2

## 2020-07-02 DIAGNOSIS — M65.352 TRIGGER LITTLE FINGER OF LEFT HAND: Primary | ICD-10-CM

## 2020-07-02 PROCEDURE — 20550 NJX 1 TENDON SHEATH/LIGAMENT: CPT | Performed by: FAMILY MEDICINE

## 2020-07-02 PROCEDURE — 3008F BODY MASS INDEX DOCD: CPT | Performed by: FAMILY MEDICINE

## 2020-07-02 RX ORDER — METHYLPREDNISOLONE ACETATE 80 MG/ML
40 INJECTION, SUSPENSION INTRA-ARTICULAR; INTRALESIONAL; INTRAMUSCULAR; SOFT TISSUE ONCE
Status: COMPLETED | OUTPATIENT
Start: 2020-07-02 | End: 2020-07-02

## 2020-07-02 RX ADMIN — METHYLPREDNISOLONE ACETATE 40 MG: 80 INJECTION, SUSPENSION INTRA-ARTICULAR; INTRALESIONAL; INTRAMUSCULAR; SOFT TISSUE at 17:10

## 2020-07-02 NOTE — PROGRESS NOTES
Procedure note Trigger finger injection left 5th finger  Prior injection with symptom relief 05/2019       Using aseptic technique a trigger finger steroid injection was performed  Left 5th finger palmar surface at base of finger DepoMedrol 80 mg/ML 0 5 ML       Patient tolerated procedure   No complications

## 2020-07-28 ENCOUNTER — TELEPHONE (OUTPATIENT)
Dept: FAMILY MEDICINE CLINIC | Facility: CLINIC | Age: 55
End: 2020-07-28

## 2020-07-28 DIAGNOSIS — F41.8 DEPRESSION WITH ANXIETY: ICD-10-CM

## 2020-07-28 RX ORDER — BUPROPION HYDROCHLORIDE 150 MG/1
150 TABLET, EXTENDED RELEASE ORAL 2 TIMES DAILY
Qty: 60 TABLET | Refills: 2 | Status: SHIPPED | OUTPATIENT
Start: 2020-07-28 | End: 2021-02-05 | Stop reason: ALTCHOICE

## 2020-07-28 NOTE — TELEPHONE ENCOUNTER
Call patient is currently on Wellbutrin 75 mg BID  Short acting form  I changed to Wellbutrin  mg BID  This is a longer acting form of Wellbutrin   Script sent to pharmacy yes...

## 2020-07-28 NOTE — TELEPHONE ENCOUNTER
Patient is requesting an increase in dosage on her Wellbutrin  She currently is taking 75 mg 1 tab 2 x  daily  Asking for 150 mg 2 x daily  She had a hysterectomy 6 weeks ago and is not feeling the same    She will need a new script sent to [facility]

## 2020-07-30 NOTE — TELEPHONE ENCOUNTER
Patient called with update  She took 2 days of increased Wellbutron dose  She feels it was too much  Made her feel very anxious & unable to fall asleep  She is going to go back to 75 mg is that is ok?

## 2020-09-21 DIAGNOSIS — F41.1 GAD (GENERALIZED ANXIETY DISORDER): ICD-10-CM

## 2020-09-22 RX ORDER — LORAZEPAM 1 MG/1
TABLET ORAL
Qty: 30 TABLET | Refills: 2 | Status: SHIPPED | OUTPATIENT
Start: 2020-09-22 | End: 2021-01-18 | Stop reason: SDUPTHER

## 2020-11-19 DIAGNOSIS — F41.8 DEPRESSION WITH ANXIETY: ICD-10-CM

## 2020-11-19 RX ORDER — BUPROPION HYDROCHLORIDE 75 MG/1
TABLET ORAL
Qty: 180 TABLET | Refills: 1 | Status: SHIPPED | OUTPATIENT
Start: 2020-11-19 | End: 2021-03-24 | Stop reason: SDUPTHER

## 2021-01-18 DIAGNOSIS — F41.1 GAD (GENERALIZED ANXIETY DISORDER): ICD-10-CM

## 2021-01-18 RX ORDER — LORAZEPAM 1 MG/1
TABLET ORAL
Qty: 30 TABLET | Refills: 2 | Status: SHIPPED | OUTPATIENT
Start: 2021-01-18 | End: 2021-03-24 | Stop reason: SDUPTHER

## 2021-02-05 ENCOUNTER — TELEPHONE (OUTPATIENT)
Dept: ADMINISTRATIVE | Facility: OTHER | Age: 56
End: 2021-02-05

## 2021-02-05 ENCOUNTER — OFFICE VISIT (OUTPATIENT)
Dept: FAMILY MEDICINE CLINIC | Facility: CLINIC | Age: 56
End: 2021-02-05
Payer: COMMERCIAL

## 2021-02-05 VITALS
RESPIRATION RATE: 16 BRPM | HEIGHT: 62 IN | HEART RATE: 88 BPM | WEIGHT: 147 LBS | DIASTOLIC BLOOD PRESSURE: 62 MMHG | TEMPERATURE: 98.4 F | SYSTOLIC BLOOD PRESSURE: 126 MMHG | BODY MASS INDEX: 27.05 KG/M2

## 2021-02-05 DIAGNOSIS — M19.042 PRIMARY OSTEOARTHRITIS OF BOTH HANDS: ICD-10-CM

## 2021-02-05 DIAGNOSIS — M65.352 TRIGGER LITTLE FINGER OF LEFT HAND: ICD-10-CM

## 2021-02-05 DIAGNOSIS — Z12.11 COLON CANCER SCREENING: ICD-10-CM

## 2021-02-05 DIAGNOSIS — M19.041 PRIMARY OSTEOARTHRITIS OF BOTH HANDS: ICD-10-CM

## 2021-02-05 DIAGNOSIS — Z00.00 WELL ADULT EXAM: Primary | ICD-10-CM

## 2021-02-05 PROBLEM — N81.4 UTEROVAGINAL PROLAPSE: Status: RESOLVED | Noted: 2019-04-09 | Resolved: 2021-02-05

## 2021-02-05 PROCEDURE — 99396 PREV VISIT EST AGE 40-64: CPT | Performed by: FAMILY MEDICINE

## 2021-02-05 PROCEDURE — 20550 NJX 1 TENDON SHEATH/LIGAMENT: CPT | Performed by: FAMILY MEDICINE

## 2021-02-05 PROCEDURE — 3725F SCREEN DEPRESSION PERFORMED: CPT | Performed by: FAMILY MEDICINE

## 2021-02-05 PROCEDURE — 3008F BODY MASS INDEX DOCD: CPT | Performed by: FAMILY MEDICINE

## 2021-02-05 PROCEDURE — 4004F PT TOBACCO SCREEN RCVD TLK: CPT | Performed by: FAMILY MEDICINE

## 2021-02-05 RX ORDER — METHYLPREDNISOLONE ACETATE 80 MG/ML
40 INJECTION, SUSPENSION INTRA-ARTICULAR; INTRALESIONAL; INTRAMUSCULAR; SOFT TISSUE ONCE
Status: COMPLETED | OUTPATIENT
Start: 2021-02-05 | End: 2021-02-05

## 2021-02-05 RX ORDER — IBUPROFEN 600 MG/1
600 TABLET ORAL 3 TIMES DAILY PRN
Qty: 60 TABLET | Refills: 1 | Status: SHIPPED | OUTPATIENT
Start: 2021-02-05 | End: 2021-03-24 | Stop reason: SDUPTHER

## 2021-02-05 RX ADMIN — METHYLPREDNISOLONE ACETATE 40 MG: 80 INJECTION, SUSPENSION INTRA-ARTICULAR; INTRALESIONAL; INTRAMUSCULAR; SOFT TISSUE at 13:05

## 2021-02-05 NOTE — TELEPHONE ENCOUNTER
----- Message from Elisha Ragland sent at 2/4/2021 12:15 PM EST -----  Regarding: PAP  02/04/21 12:16 PM    Hello, our patient Xi Pruitt has had  THIN PREP PAP completed/performed   Please assist in updating the patient chart by REVIEWING RESULTS IN CARE EVERYWHERE The date of service is 03/27/2019    Thank you,  Elisha MCADAMS

## 2021-02-05 NOTE — PROGRESS NOTES
Assessment/Plan:     Diagnoses and all orders for this visit:    Well adult exam    Trigger little finger of left hand  -     methylPREDNISolone acetate (DEPO-MEDROL) injection 40 mg  -     Hand/upper extremity injection: L small A1    Primary osteoarthritis of both hands  -     ibuprofen (MOTRIN) 600 mg tablet; Take 1 tablet (600 mg total) by mouth 3 (three) times a day as needed for mild pain    Colon cancer screening  -     Cologuard; Future          Patient is not interested in any adult vaccines, labs  Patient agreeable to Cologuard  She is scheduled for a mammogram   I discussed PT for right hand OA- she declined for now  Script for prn Ibuprofen 600 mg  As a separate procedure I performed a steroid injection for trigger finger left 5th finger-see procedure note  BMI Counseling: Body mass index is 26 89 kg/m²  The BMI is above normal  Nutrition recommendations include reducing portion sizes, decreasing overall calorie intake, consuming healthier snacks, moderation in carbohydrate intake, reducing intake of saturated fat and trans fat and reducing intake of cholesterol  Exercise recommendations include exercising 3-5 times per week  Tobacco Cessation Counseling: Tobacco cessation counseling and education was provided  The patient is sincerely urged to quit consumption of tobacco  She is not ready to quit tobacco  The numerous health risks of tobacco consumption were discussed  If she decides to quit, there are a number of helpful adjunctive aids, and she can see me to discuss nicotine replacement therapy, chantix, or bupropion anytime in the future  Patient ID: Abena Cole is a 60 y o  female  57-year-old female here for wellness exam   Medications reviewed  Hospitalizations/surgeries-vaginal hysterectomy/right salpingectomy  Family history CAD father  Depression father  Lung CA mother  Social history smoker 1/2 PPD  No recent lipid screening  Last mammogram 01/2020            The following portions of the patient's history were reviewed and updated as appropriate: allergies, current medications, past family history, past medical history, past social history, past surgical history and problem list     Review of Systems   Constitutional: Negative for appetite change, chills, fatigue, fever and unexpected weight change  HENT: Negative for congestion, ear pain, rhinorrhea, sore throat, trouble swallowing and voice change  Eyes: Negative for visual disturbance  Respiratory: Negative for cough, shortness of breath and wheezing  Cardiovascular: Negative for chest pain, palpitations and leg swelling  EKG 01/2017   Gastrointestinal: Negative for abdominal pain, blood in stool, constipation, diarrhea, nausea and vomiting         02/2015 EGD sliding [facility]  03/2015 colonoscopy benign polyps  Genitourinary: Negative for difficulty urinating  Uterovaginal prolapse 01/2020 status post total vaginal hysterectomy, vaginal colpopexy, intraperitoneal uterosacral ligament vaginal vault suspension, AP repair, enterocele repair and right salpingectomy   Musculoskeletal: Positive for arthralgias and back pain  Negative for joint swelling and myalgias  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 with decreased ROM  Symptomatic trigger finger left 5th finger  Skin: Negative for rash  Neurological: Negative for dizziness and headaches  Hematological: Negative for adenopathy  Does not bruise/bleed easily  History of polycythemia   Repeat CBC 01/2020- Hgb 12 8          Component                Value               Date                       WBC                      14 93 (H)           01/08/2015                 HGB                      18 6 (H)            01/08/2015                 HCT                      56 5 (H)            01/08/2015                 MCV                      99 (H)              01/08/2015                 PLT                      327                 01/08/2015 Psychiatric/Behavioral: Positive for dysphoric mood  Negative for sleep disturbance  The patient is nervous/anxious  Stable on Wellbutrin 75 mg BID and prn Lorazepam           Objective:      /62   Pulse 88   Temp 98 4 °F (36 9 °C)   Resp 16   Ht 5' 2" (1 575 m)   Wt 66 7 kg (147 lb)   BMI 26 89 kg/m²     BP Readings from Last 3 Encounters:   02/05/21 126/62   07/02/20 118/78   01/06/20 118/70       Wt Readings from Last 3 Encounters:   02/05/21 66 7 kg (147 lb)   07/02/20 65 8 kg (145 lb)   01/06/20 66 7 kg (147 lb)          Physical Exam  Vitals signs and nursing note reviewed  Constitutional:       General: She is not in acute distress  Appearance: She is well-developed  HENT:      Right Ear: Tympanic membrane and ear canal normal       Left Ear: Tympanic membrane and ear canal normal    Eyes:      General: No scleral icterus  Extraocular Movements: Extraocular movements intact  Conjunctiva/sclera: Conjunctivae normal       Pupils: Pupils are equal, round, and reactive to light  Neck:      Thyroid: No thyroid mass or thyromegaly  Vascular: No carotid bruit or JVD  Trachea: No tracheal deviation  Cardiovascular:      Rate and Rhythm: Normal rate and regular rhythm  Heart sounds: Normal heart sounds  No murmur  No gallop  Pulmonary:      Effort: Pulmonary effort is normal  No respiratory distress  Breath sounds: Normal breath sounds  No wheezing or rales  Abdominal:      General: Bowel sounds are normal  There is no distension or abdominal bruit  Palpations: Abdomen is soft  There is no hepatomegaly, splenomegaly or mass  Tenderness: There is no abdominal tenderness  There is no guarding or rebound  Musculoskeletal:         General: Deformity present  Right lower leg: No edema  Left lower leg: No edema  Comments: Heberden's nodes of both hands  Mild decrease in flexion of fingers on right hand    Triggering of left 5th finger  No tenderness or swelling over MCP joints or wrists  Lymphadenopathy:      Cervical: No cervical adenopathy  Upper Body:      Right upper body: No supraclavicular adenopathy  Left upper body: No supraclavicular adenopathy  Skin:     Findings: No rash  Nails: There is no clubbing  Neurological:      General: No focal deficit present  Mental Status: She is alert and oriented to person, place, and time  Psychiatric:         Mood and Affect: Mood normal          Behavior: Behavior normal        Hand/upper extremity injection: L small A1  Universal Protocol:  Consent: Verbal consent obtained  Risks and benefits: risks, benefits and alternatives were discussed  Patient identity confirmed: verbally with patient    Supporting Documentation  Indications: pain (Trigger finger left 5th finger)   Procedure Details  Condition:trigger finger Location: small finger - L small A1   Preparation: Alcohol   Needle size: 25 G  Approach: volar    Patient tolerance: patient tolerated the procedure well with no immediate complications    DepoMedrol 80 mg/ML-0 5 ML

## 2021-02-05 NOTE — TELEPHONE ENCOUNTER
Upon review of the In Basket request we were able to locate, review, and update the patient chart as requested for Pap Smear (HPV) aka Cervical Cancer Screening  Any additional questions or concerns should be emailed to the Practice Liaisons via [email or web address] email, please do not reply via In Basket      Thank you  Lion Juarez

## 2021-02-05 NOTE — TELEPHONE ENCOUNTER
----- Message from Kobe Holder sent at 2/4/2021 12:15 PM EST -----  Regarding: PAP  02/04/21 12:16 PM    Hello, our patient Mike Manzanares has had  THIN PREP PAP completed/performed   Please assist in updating the patient chart by REVIEWING RESULTS IN CARE EVERYWHERE The date of service is 03/27/2019    Thank you,  Kobe MCADAMS

## 2021-03-24 ENCOUNTER — OFFICE VISIT (OUTPATIENT)
Dept: FAMILY MEDICINE CLINIC | Facility: CLINIC | Age: 56
End: 2021-03-24
Payer: COMMERCIAL

## 2021-03-24 VITALS
BODY MASS INDEX: 27.07 KG/M2 | DIASTOLIC BLOOD PRESSURE: 72 MMHG | RESPIRATION RATE: 16 BRPM | TEMPERATURE: 97.4 F | HEART RATE: 88 BPM | WEIGHT: 148 LBS | SYSTOLIC BLOOD PRESSURE: 126 MMHG

## 2021-03-24 DIAGNOSIS — F41.8 DEPRESSION WITH ANXIETY: ICD-10-CM

## 2021-03-24 DIAGNOSIS — M65.342 TRIGGER RING FINGER OF LEFT HAND: Primary | ICD-10-CM

## 2021-03-24 DIAGNOSIS — F41.1 GAD (GENERALIZED ANXIETY DISORDER): ICD-10-CM

## 2021-03-24 DIAGNOSIS — M65.312 TRIGGER FINGER OF LEFT THUMB: ICD-10-CM

## 2021-03-24 DIAGNOSIS — M19.041 PRIMARY OSTEOARTHRITIS OF BOTH HANDS: ICD-10-CM

## 2021-03-24 DIAGNOSIS — M19.042 PRIMARY OSTEOARTHRITIS OF BOTH HANDS: ICD-10-CM

## 2021-03-24 PROCEDURE — 20550 NJX 1 TENDON SHEATH/LIGAMENT: CPT | Performed by: FAMILY MEDICINE

## 2021-03-24 RX ORDER — METHYLPREDNISOLONE ACETATE 80 MG/ML
40 INJECTION, SUSPENSION INTRA-ARTICULAR; INTRALESIONAL; INTRAMUSCULAR; SOFT TISSUE ONCE
Status: COMPLETED | OUTPATIENT
Start: 2021-03-24 | End: 2021-03-24

## 2021-03-24 RX ORDER — IBUPROFEN 600 MG/1
600 TABLET ORAL 3 TIMES DAILY PRN
Qty: 60 TABLET | Refills: 2 | Status: SHIPPED | OUTPATIENT
Start: 2021-03-24

## 2021-03-24 RX ORDER — BUPROPION HYDROCHLORIDE 75 MG/1
75 TABLET ORAL 2 TIMES DAILY
Qty: 180 TABLET | Refills: 1 | Status: SHIPPED | OUTPATIENT
Start: 2021-03-24 | End: 2021-12-01 | Stop reason: SDUPTHER

## 2021-03-24 RX ORDER — LORAZEPAM 1 MG/1
TABLET ORAL
Qty: 30 TABLET | Refills: 2 | Status: SHIPPED | OUTPATIENT
Start: 2021-03-24 | End: 2021-07-22 | Stop reason: SDUPTHER

## 2021-03-24 RX ADMIN — METHYLPREDNISOLONE ACETATE 40 MG: 80 INJECTION, SUSPENSION INTRA-ARTICULAR; INTRALESIONAL; INTRAMUSCULAR; SOFT TISSUE at 11:47

## 2021-03-24 NOTE — PROGRESS NOTES
Hand/upper extremity injection: L ring A1  Universal Protocol:  Consent: Verbal consent obtained  Risks and benefits: risks, benefits and alternatives were discussed  Consent given by: patient    Supporting Documentation  Indications: pain and tendon swelling (Trigger finger left ring finger)   Procedure Details  Condition:trigger finger Location: ring finger - L ring A1   Preparation: Alcohol  Needle size: 25 G  Ultrasound guidance: no  Approach: volar    Patient tolerance: patient tolerated the procedure well with no immediate complications  Dressing:  Sterile dressing applied    DepoMedrol 80 mg/ML-0 5 ML     Hand/upper extremity injection: L thumb A1  Universal Protocol:  Consent: Verbal consent obtained  Risks and benefits: risks, benefits and alternatives were discussed  Consent given by: patient    Supporting Documentation  Indications: pain and tendon swelling (Trigger finger left thumb)   Procedure Details  Condition:trigger finger Location: thumb - L thumb A1   Preparation: Alcohol  Needle size: 25 G  Ultrasound guidance: no  Approach: volar    Patient tolerance: patient tolerated the procedure well with no immediate complications  Dressing:  Sterile dressing applied    DepoMedrol 80 mg/ML-0 5 ML

## 2021-05-18 ENCOUNTER — IMMUNIZATIONS (OUTPATIENT)
Dept: FAMILY MEDICINE CLINIC | Facility: HOSPITAL | Age: 56
End: 2021-05-18

## 2021-05-18 DIAGNOSIS — Z23 ENCOUNTER FOR IMMUNIZATION: Primary | ICD-10-CM

## 2021-05-18 PROCEDURE — 0001A SARS-COV-2 / COVID-19 MRNA VACCINE (PFIZER-BIONTECH) 30 MCG: CPT

## 2021-05-18 PROCEDURE — 91300 SARS-COV-2 / COVID-19 MRNA VACCINE (PFIZER-BIONTECH) 30 MCG: CPT

## 2021-06-09 ENCOUNTER — IMMUNIZATIONS (OUTPATIENT)
Dept: FAMILY MEDICINE CLINIC | Facility: HOSPITAL | Age: 56
End: 2021-06-09

## 2021-06-09 DIAGNOSIS — Z23 ENCOUNTER FOR IMMUNIZATION: Primary | ICD-10-CM

## 2021-06-09 PROCEDURE — 91300 SARS-COV-2 / COVID-19 MRNA VACCINE (PFIZER-BIONTECH) 30 MCG: CPT

## 2021-06-09 PROCEDURE — 0002A SARS-COV-2 / COVID-19 MRNA VACCINE (PFIZER-BIONTECH) 30 MCG: CPT

## 2021-07-22 DIAGNOSIS — F41.1 GAD (GENERALIZED ANXIETY DISORDER): ICD-10-CM

## 2021-07-22 RX ORDER — LORAZEPAM 1 MG/1
TABLET ORAL
Qty: 30 TABLET | Refills: 2 | Status: SHIPPED | OUTPATIENT
Start: 2021-07-22 | End: 2021-10-25 | Stop reason: SDUPTHER

## 2021-08-10 ENCOUNTER — TELEPHONE (OUTPATIENT)
Dept: FAMILY MEDICINE CLINIC | Facility: CLINIC | Age: 56
End: 2021-08-10

## 2021-08-10 DIAGNOSIS — Z12.31 ENCOUNTER FOR SCREENING MAMMOGRAM FOR BREAST CANCER: Primary | ICD-10-CM

## 2021-10-25 DIAGNOSIS — F41.1 GAD (GENERALIZED ANXIETY DISORDER): ICD-10-CM

## 2021-10-26 RX ORDER — LORAZEPAM 1 MG/1
TABLET ORAL
Qty: 30 TABLET | Refills: 2 | Status: SHIPPED | OUTPATIENT
Start: 2021-10-26 | End: 2022-01-31

## 2021-11-12 ENCOUNTER — PROCEDURE VISIT (OUTPATIENT)
Dept: FAMILY MEDICINE CLINIC | Facility: CLINIC | Age: 56
End: 2021-11-12
Payer: COMMERCIAL

## 2021-11-12 VITALS
HEIGHT: 62 IN | SYSTOLIC BLOOD PRESSURE: 142 MMHG | DIASTOLIC BLOOD PRESSURE: 82 MMHG | HEART RATE: 88 BPM | BODY MASS INDEX: 26.13 KG/M2 | RESPIRATION RATE: 16 BRPM | TEMPERATURE: 96.8 F | WEIGHT: 142 LBS

## 2021-11-12 DIAGNOSIS — M65.342 TRIGGER RING FINGER OF LEFT HAND: ICD-10-CM

## 2021-11-12 DIAGNOSIS — M65.341 TRIGGER RING FINGER OF RIGHT HAND: Primary | ICD-10-CM

## 2021-11-12 DIAGNOSIS — M65.352 TRIGGER LITTLE FINGER OF LEFT HAND: ICD-10-CM

## 2021-11-12 PROCEDURE — 20550 NJX 1 TENDON SHEATH/LIGAMENT: CPT | Performed by: FAMILY MEDICINE

## 2021-11-12 RX ORDER — METHYLPREDNISOLONE ACETATE 80 MG/ML
40 INJECTION, SUSPENSION INTRA-ARTICULAR; INTRALESIONAL; INTRAMUSCULAR; SOFT TISSUE ONCE
Status: COMPLETED | OUTPATIENT
Start: 2021-11-12 | End: 2021-11-12

## 2021-11-12 RX ADMIN — METHYLPREDNISOLONE ACETATE 40 MG: 80 INJECTION, SUSPENSION INTRA-ARTICULAR; INTRALESIONAL; INTRAMUSCULAR; SOFT TISSUE at 08:56

## 2021-12-01 DIAGNOSIS — F41.8 DEPRESSION WITH ANXIETY: ICD-10-CM

## 2021-12-01 RX ORDER — BUPROPION HYDROCHLORIDE 75 MG/1
75 TABLET ORAL 2 TIMES DAILY
Qty: 180 TABLET | Refills: 1 | Status: SHIPPED | OUTPATIENT
Start: 2021-12-01 | End: 2022-06-03 | Stop reason: SDUPTHER

## 2022-01-31 DIAGNOSIS — F41.1 GAD (GENERALIZED ANXIETY DISORDER): ICD-10-CM

## 2022-01-31 RX ORDER — LORAZEPAM 1 MG/1
TABLET ORAL
Qty: 30 TABLET | Refills: 2 | Status: SHIPPED | OUTPATIENT
Start: 2022-01-31 | End: 2022-04-28

## 2022-01-31 NOTE — TELEPHONE ENCOUNTER
12/29/2021  1  10/26/2021  LORAZEPAM 1 MG TABLET  30 0  30  JA MAR  [id number]  GIANT ([id number])  2   Comm Ins  [place]

## 2022-02-02 ENCOUNTER — TELEPHONE (OUTPATIENT)
Dept: FAMILY MEDICINE CLINIC | Facility: CLINIC | Age: 57
End: 2022-02-02

## 2022-02-02 NOTE — TELEPHONE ENCOUNTER
Per patient she did not receive a cologuard kit last year   Asked if she would like a new kit sent out but she declined and would like to discuss a possible colonoscopy at her appointment on Monday

## 2022-02-06 NOTE — PROGRESS NOTES
Assessment/Plan:       Diagnoses and all orders for this visit:    Well adult exam    Nicotine dependence with current use  -     CT lung screening program; Future    History of colon polyps  -     Ambulatory Referral to Gastroenterology; Future    Other orders  -     CALCIUM-MAGNESIUM-ZINC PO; Take 1 tablet by mouth in the morning          Patient not interested in lipid profile, flu vaccine or Pneumovax  CT lung CA screening  GI referral for colonoscopy     BMI Counseling: Body mass index is 25 61 kg/m²  The BMI is above normal  Nutrition recommendations include reducing portion sizes, decreasing overall calorie intake, consuming healthier snacks, moderation in carbohydrate intake, reducing intake of saturated fat and trans fat and reducing intake of cholesterol  Exercise recommendations include exercising 3-5 times per week  Patient ID: Danyel Gaspar is a 60 y o  female  78-year-old female here for wellness exam   Medications reviewed  Hospitalizations/surgeries-vaginal hysterectomy/right salpingectomy  Family history CAD father  Depression father  Lung CA mother  Social history long time smoker  1/2 PPD  No recent lipid screening  Mammogram 08/2021  The following portions of the patient's history were reviewed and updated as appropriate: allergies, current medications, past family history, past medical history, past social history, past surgical history and problem list     Review of Systems   Constitutional: Positive for unexpected weight change (8 lb weight loss from 03/2021)  Negative for appetite change, chills, fatigue and fever  HENT: Negative for congestion, ear pain, rhinorrhea, sore throat, trouble swallowing and voice change  Eyes: Negative for visual disturbance  Respiratory: Negative for cough, shortness of breath and wheezing  Cardiovascular: Negative for chest pain, palpitations and leg swelling          EKG 01/2017   Gastrointestinal: Negative for abdominal pain, blood in stool, constipation, diarrhea, nausea and vomiting         02/2015 EGD sliding [facility]  03/2015 colonoscopy benign polyps  Genitourinary: Negative for difficulty urinating  Uterovaginal prolapse 01/2020 status post total vaginal hysterectomy, vaginal colpopexy, intraperitoneal uterosacral ligament vaginal vault suspension, AP repair, enterocele repair and right salpingectomy  03/2019 pap smear  Musculoskeletal: Positive for arthralgias and back pain  Negative for joint swelling and myalgias  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 using prn Ibuprofen    Skin: Negative for rash  Neurological: Negative for dizziness and headaches  Hematological: Negative for adenopathy  Does not bruise/bleed easily  History of polycythemia  Repeat CBC 01/2020- Hgb 12 8          Component                Value               Date                       WBC                      14 93 (H)           01/08/2015                 HGB                      18 6 (H)            01/08/2015                 HCT                      56 5 (H)            01/08/2015                 MCV                      99 (H)              01/08/2015                 PLT                      327                 01/08/2015               Psychiatric/Behavioral: Positive for dysphoric mood  Negative for sleep disturbance  The patient is nervous/anxious  Stable on Wellbutrin 75 mg BID and prn Lorazepam           Objective:    /74   Pulse 88   Temp 98 °F (36 7 °C)   Resp 16   Ht 5' 2" (1 575 m)   Wt 63 5 kg (140 lb)   BMI 25 61 kg/m²       BP Readings from Last 3 Encounters:   02/07/22 128/74   11/12/21 142/82   03/24/21 126/72     Wt Readings from Last 3 Encounters:   02/07/22 63 5 kg (140 lb)   11/12/21 64 4 kg (142 lb)   03/24/21 67 1 kg (148 lb)        Physical Exam  Vitals and nursing note reviewed  Constitutional:       General: She is not in acute distress  Appearance: She is well-developed     HENT:      Right Ear: Tympanic membrane and ear canal normal       Left Ear: Tympanic membrane and ear canal normal    Eyes:      General: No scleral icterus  Extraocular Movements: Extraocular movements intact  Conjunctiva/sclera: Conjunctivae normal       Pupils: Pupils are equal, round, and reactive to light  Neck:      Thyroid: No thyroid mass or thyromegaly  Vascular: No carotid bruit or JVD  Trachea: No tracheal deviation  Cardiovascular:      Rate and Rhythm: Normal rate and regular rhythm  Heart sounds: Normal heart sounds  No murmur heard  No gallop  Pulmonary:      Effort: Pulmonary effort is normal  No respiratory distress  Breath sounds: Normal breath sounds  No wheezing or rales  Chest:   Breasts:      Right: No supraclavicular adenopathy  Left: No supraclavicular adenopathy  Musculoskeletal:      Right lower leg: No edema  Left lower leg: No edema  Lymphadenopathy:      Cervical: No cervical adenopathy  Upper Body:      Right upper body: No supraclavicular adenopathy  Left upper body: No supraclavicular adenopathy  Skin:     Findings: No rash  Nails: There is no clubbing  Neurological:      General: No focal deficit present  Mental Status: She is alert and oriented to person, place, and time  Psychiatric:         Mood and Affect: Mood normal          Behavior: Behavior normal          Thought Content:  Thought content normal          Judgment: Judgment normal

## 2022-02-07 ENCOUNTER — OFFICE VISIT (OUTPATIENT)
Dept: FAMILY MEDICINE CLINIC | Facility: CLINIC | Age: 57
End: 2022-02-07
Payer: COMMERCIAL

## 2022-02-07 ENCOUNTER — TELEPHONE (OUTPATIENT)
Dept: ADMINISTRATIVE | Facility: OTHER | Age: 57
End: 2022-02-07

## 2022-02-07 VITALS
SYSTOLIC BLOOD PRESSURE: 128 MMHG | BODY MASS INDEX: 25.76 KG/M2 | HEART RATE: 88 BPM | TEMPERATURE: 98 F | DIASTOLIC BLOOD PRESSURE: 74 MMHG | RESPIRATION RATE: 16 BRPM | WEIGHT: 140 LBS | HEIGHT: 62 IN

## 2022-02-07 DIAGNOSIS — F17.200 NICOTINE DEPENDENCE WITH CURRENT USE: ICD-10-CM

## 2022-02-07 DIAGNOSIS — Z00.00 WELL ADULT EXAM: Primary | ICD-10-CM

## 2022-02-07 DIAGNOSIS — Z86.010 HISTORY OF COLON POLYPS: ICD-10-CM

## 2022-02-07 PROBLEM — N81.11 MIDLINE CYSTOCELE: Status: RESOLVED | Noted: 2019-08-16 | Resolved: 2022-02-07

## 2022-02-07 PROBLEM — N81.6 RECTOCELE: Status: RESOLVED | Noted: 2019-08-16 | Resolved: 2022-02-07

## 2022-02-07 PROCEDURE — 3725F SCREEN DEPRESSION PERFORMED: CPT | Performed by: FAMILY MEDICINE

## 2022-02-07 PROCEDURE — 4004F PT TOBACCO SCREEN RCVD TLK: CPT | Performed by: FAMILY MEDICINE

## 2022-02-07 PROCEDURE — 3008F BODY MASS INDEX DOCD: CPT | Performed by: FAMILY MEDICINE

## 2022-02-07 PROCEDURE — 99396 PREV VISIT EST AGE 40-64: CPT | Performed by: FAMILY MEDICINE

## 2022-02-07 NOTE — TELEPHONE ENCOUNTER
Upon review of the In Basket request we found that Cervical Cancer Screening DOS 3- was previously resulted Feb 5, 2021 at 1:04 PM and is present in [facility]  Also the patient has an operative report for Total Hysterectomy DOS 1- in the Encounters tab  Any additional questions or concerns should be emailed to the Practice Liaisons via [email or web address] email, please do not reply via In Basket      Thank you  Jb Miller MA

## 2022-02-07 NOTE — TELEPHONE ENCOUNTER
----- Message from Luigi Chan sent at 2/4/2022 12:14 PM EST -----  Regarding: Pap  02/04/22 12:14 PM    Tamiko, our patient Nora Knowles has had Thin Prep Pap completed/performed   Please assist in updating the patient chart by reviewing results in Care Everywhere The date of service is 03/27/2019    Thank you,  Luigi MCADAMS

## 2022-04-15 ENCOUNTER — TELEPHONE (OUTPATIENT)
Dept: GASTROENTEROLOGY | Facility: AMBULARY SURGERY CENTER | Age: 57
End: 2022-04-15

## 2022-04-15 ENCOUNTER — CONSULT (OUTPATIENT)
Dept: GASTROENTEROLOGY | Facility: AMBULARY SURGERY CENTER | Age: 57
End: 2022-04-15
Payer: COMMERCIAL

## 2022-04-15 VITALS
DIASTOLIC BLOOD PRESSURE: 86 MMHG | SYSTOLIC BLOOD PRESSURE: 136 MMHG | WEIGHT: 141.6 LBS | BODY MASS INDEX: 26.06 KG/M2 | HEART RATE: 75 BPM | OXYGEN SATURATION: 98 % | HEIGHT: 62 IN

## 2022-04-15 DIAGNOSIS — Z86.010 HISTORY OF COLON POLYPS: ICD-10-CM

## 2022-04-15 DIAGNOSIS — K21.9 GASTROESOPHAGEAL REFLUX DISEASE, UNSPECIFIED WHETHER ESOPHAGITIS PRESENT: Primary | ICD-10-CM

## 2022-04-15 PROCEDURE — 3008F BODY MASS INDEX DOCD: CPT | Performed by: INTERNAL MEDICINE

## 2022-04-15 PROCEDURE — 4004F PT TOBACCO SCREEN RCVD TLK: CPT | Performed by: INTERNAL MEDICINE

## 2022-04-15 PROCEDURE — 99204 OFFICE O/P NEW MOD 45 MIN: CPT | Performed by: INTERNAL MEDICINE

## 2022-04-15 RX ORDER — SODIUM PICOSULFATE, MAGNESIUM OXIDE, AND ANHYDROUS CITRIC ACID 10; 3.5; 12 MG/160ML; G/160ML; G/160ML
LIQUID ORAL
Qty: 320 ML | Refills: 0 | Status: SHIPPED | OUTPATIENT
Start: 2022-04-15

## 2022-04-15 NOTE — PATIENT INSTRUCTIONS
Scheduled date of colonoscopy (as of today): 7/28/2022  Physician performing colonoscopy: Dr Ehsan Styles  Location of colonoscopy:  [facility]  Bowel prep reviewed with patient: Clenpiq  Instructions reviewed with patient by: Bay Mendiola  Clearances: None

## 2022-04-15 NOTE — PROGRESS NOTES
Consultation - [facility]  Chong Canavan 60 y o  female MRN: [id number]  Unit/Bed#:  Encounter: [id number]        Consults    ASSESSMENT/PLAN:     1  Colon cancer screening-increased risk secondary to history of adenomatous polyps, patient is due for colonoscopy at this time based on screening guidelines  -will schedule screening colonoscopy at this time  Patient was explained about  the risks and benefits of the procedure  Risks including but not limited to bleeding, infection, perforation were explained in detail  Also explained about less than 100% sensitivity with the exam and other alternatives  -patient is not on any anticoagulants, she does take ibuprofen p r n     -no recent labs, last labs are from 2020, hemoglobin was normal at 14 9, platelets were 502  Will check CBC and CMP prior to the colonoscopy to establish baseline  2 GERD- infrequent symptoms  - recommend GERD diet, avoid NSAIDs     - No need for EGD as symptoms are infrequent and no alarm symptoms  ______________________________________________________________________    Reason for Consult / Principal Problem: [unfilled]    HPI: Chong Canavan is a 60y o  year old female with history of colon polyps, presents for colon cancer screening evaluation  Patient reports that she last underwent colonoscopy in 2015  Colonoscopy in 2015 was notable for diverticulosis a and several colon polyps, 1 of the polyps was tubular adenoma without high-grade dysplasia  She denies any family history of colon cancer no change in bowel habits or hematochezia, no unintentional weight loss  Denies any abdominal pain  She reports very rare symptoms of acid reflux, typically only with spaghetti sauce  Denies dysphagia, odynophagia, loss of appetite or early satiety, no reports of melena or hematochezia  No nausea or vomiting  CBC from January of 2020 was notable for hemoglobin of 14 9, platelets 252   No other recent labs       Review of Systems: The remainder of the review of systems was negative except for the pertinent positives noted in HPI  Historical Information   Past Medical History:   Diagnosis Date    Acquired polycythemia 1/24/2015    Carpal tunnel syndrome on right     Last assessed 01/29/14    Ileus ([address] ) 2/14/2015    Lateral epicondylitis     Left- Last assessed 01/29/14    Midline cystocele 8/16/2019    Last Assessment & Plan:  We discussed the nature of pelvic organ prolapse, and reviewed basic alternatives including expectant management, pessary management, and surgical repair  We discussed the elective nature of prolapse treatment, which in nearly all cases can be postponed or avoided if symptoms are not bothersome or inhibiting to ones level of function and/or quality of life  The patient ha    Rectocele 8/16/2019    Last Assessment & Plan:  We discussed the nature of pelvic organ prolapse, and reviewed basic alternatives including expectant management, pessary management, and surgical repair  We discussed the elective nature of prolapse treatment, which in nearly all cases can be postponed or avoided if symptoms are not bothersome or inhibiting to ones level of function and/or quality of life  The patient ha    Uterovaginal prolapse 4/9/2019    Last Assessment & Plan:  We discussed the nature of pelvic organ prolapse, and reviewed basic alternatives including expectant management, pessary management, and surgical repair  We discussed the elective nature of prolapse treatment, which in nearly all cases can be postponed or avoided if symptoms are not bothersome or inhibiting to ones level of function and/or quality of life  The patient ha     History reviewed  No pertinent surgical history    Social History   Social History     Substance and Sexual Activity   Alcohol Use Yes    Alcohol/week: 2 0 standard drinks    Types: 2 Cans of beer per week    Comment: Social     Social History     Substance and Sexual Activity   Drug Use No     Social History     Tobacco Use   Smoking Status Current Every Day Smoker    Packs/day: 0 25    Years: 35 00    Pack years: 8 75   Smokeless Tobacco Never Used   Tobacco Comment    7-10 daily     Family History   Problem Relation Age of Onset    Lung cancer Mother     Depression Father        Meds/Allergies     (Not in a hospital admission)    No current facility-administered medications for this visit  No Known Allergies    Objective     Blood pressure 136/86, pulse 75, height 5' 2" (1 575 m), weight 64 2 kg (141 lb 9 6 oz), SpO2 98 %, not currently breastfeeding  [unfilled]    PHYSICAL EXAM     GEN: well nourished, well developed, no acute distress  HEENT: anicteric, MMM, no cervical or supraclavicular lymphadenopathy  CV: RRR, no m/r/g  CHEST: CTA b/l, no WRR  ABD: +BS, soft, NT/ND, no hepatosplenomegaly  EXT: no c/c/e  SKIN: no rashes,  NEURO: aaox3    Lab Results:   No visits with results within 1 Day(s) from this visit  Latest known visit with results is:   No results found for any previous visit       Imaging Studies: I have personally reviewed pertinent films in PACS

## 2022-04-15 NOTE — LETTER
April 15, 2022     Rm Morales MD  [address] [place] [address]    Patient: Anabel Carroll   YOB: 1965   Date of Visit: 4/15/2022       Dear Dr Soriano Doing:    Thank you for referring Jasiel Angel to me for evaluation  Below are my notes for this consultation  If you have questions, please do not hesitate to call me  I look forward to following your patient along with you  Sincerely,        Rosa Elena Jerome MD        CC: No Recipients  Rosa Elena Jerome MD  4/15/2022 10:03 AM  Sign when Signing Visit  Consultation - [facility]  Anabel Carroll 60 y o  female MRN: [id number]  Unit/Bed#:  Encounter: [id number]        Consults    ASSESSMENT/PLAN:     1  Colon cancer screening-increased risk secondary to history of adenomatous polyps, patient is due for colonoscopy at this time based on screening guidelines  -will schedule screening colonoscopy at this time  Patient was explained about  the risks and benefits of the procedure  Risks including but not limited to bleeding, infection, perforation were explained in detail  Also explained about less than 100% sensitivity with the exam and other alternatives  -patient is not on any anticoagulants, she does take ibuprofen p r n     -no recent labs, last labs are from 2020, hemoglobin was normal at 14 9, platelets were 789  Will check CBC and CMP prior to the colonoscopy to establish baseline  2 GERD- infrequent symptoms  - recommend GERD diet, avoid NSAIDs     - No need for EGD as symptoms are infrequent and no alarm symptoms  ______________________________________________________________________    Reason for Consult / Principal Problem: [unfilled]    HPI: Anabel Carroll is a 60y o  year old female with history of colon polyps, presents for colon cancer screening evaluation  Patient reports that she last underwent colonoscopy in 2015   Colonoscopy in 2015 was notable for diverticulosis a and several colon polyps, 1 of the polyps was tubular adenoma without high-grade dysplasia  She denies any family history of colon cancer no change in bowel habits or hematochezia, no unintentional weight loss  Denies any abdominal pain  She reports very rare symptoms of acid reflux, typically only with spaghetti sauce  Denies dysphagia, odynophagia, loss of appetite or early satiety, no reports of melena or hematochezia  No nausea or vomiting  CBC from January of 2020 was notable for hemoglobin of 14 9, platelets 966  No other recent labs  Review of Systems: The remainder of the review of systems was negative except for the pertinent positives noted in HPI  Historical Information   Past Medical History:   Diagnosis Date    Acquired polycythemia 1/24/2015    Carpal tunnel syndrome on right     Last assessed 01/29/14    [address] [address] 2/14/2015    Lateral epicondylitis     Left- Last assessed 01/29/14    Midline cystocele 8/16/2019    Last Assessment & Plan:  We discussed the nature of pelvic organ prolapse, and reviewed basic alternatives including expectant management, pessary management, and surgical repair  We discussed the elective nature of prolapse treatment, which in nearly all cases can be postponed or avoided if symptoms are not bothersome or inhibiting to ones level of function and/or quality of life  The patient ha    Rectocele 8/16/2019    Last Assessment & Plan:  We discussed the nature of pelvic organ prolapse, and reviewed basic alternatives including expectant management, pessary management, and surgical repair  We discussed the elective nature of prolapse treatment, which in nearly all cases can be postponed or avoided if symptoms are not bothersome or inhibiting to ones level of function and/or quality of life   The patient ha    Uterovaginal prolapse 4/9/2019    Last Assessment & Plan:  We discussed the nature of pelvic organ prolapse, and reviewed basic alternatives including expectant management, pessary management, and surgical repair  We discussed the elective nature of prolapse treatment, which in nearly all cases can be postponed or avoided if symptoms are not bothersome or inhibiting to ones level of function and/or quality of life  The patient ha     History reviewed  No pertinent surgical history  Social History   Social History     Substance and Sexual Activity   Alcohol Use Yes    Alcohol/week: 2 0 standard drinks    Types: 2 Cans of beer per week    Comment: Social     Social History     Substance and Sexual Activity   Drug Use No     Social History     Tobacco Use   Smoking Status Current Every Day Smoker    Packs/day: 0 25    Years: 35 00    Pack years: 8 75   Smokeless Tobacco Never Used   Tobacco Comment    7-10 daily     Family History   Problem Relation Age of Onset    Lung cancer Mother     Depression Father        Meds/Allergies     (Not in a hospital admission)    No current facility-administered medications for this visit  No Known Allergies    Objective     Blood pressure 136/86, pulse 75, height 5' 2" (1 575 m), weight 64 2 kg (141 lb 9 6 oz), SpO2 98 %, not currently breastfeeding  [unfilled]    PHYSICAL EXAM     GEN: well nourished, well developed, no acute distress  HEENT: anicteric, MMM, no cervical or supraclavicular lymphadenopathy  CV: RRR, no m/r/g  CHEST: CTA b/l, no WRR  ABD: +BS, soft, NT/ND, no hepatosplenomegaly  EXT: no c/c/e  SKIN: no rashes,  NEURO: aaox3    Lab Results:   No visits with results within 1 Day(s) from this visit  Latest known visit with results is:   No results found for any previous visit       Imaging Studies: I have personally reviewed pertinent films in PACS

## 2022-04-15 NOTE — TELEPHONE ENCOUNTER
Patient is scheduled for colonoscopy on July 28 , 2022 at [facility]  with Petra Tijerina Se, MD  Patient is aware of pre-procedure prep of Clenpiq and they will be called the day prior between 2 and 6 pm for time to report for procedure  Pre-procedure prep has been given to the patient  in person  on April 15 , 2022

## 2022-04-28 DIAGNOSIS — F41.1 GAD (GENERALIZED ANXIETY DISORDER): ICD-10-CM

## 2022-04-28 NOTE — TELEPHONE ENCOUNTER
03/29/2022 01/31/2022 LORazepam (Tablet)  30 0 30 1 MG NA 2437 [id number]  Commercial Insurance 02 / 2 [place]

## 2022-04-29 RX ORDER — LORAZEPAM 1 MG/1
TABLET ORAL
Qty: 30 TABLET | Refills: 2 | Status: SHIPPED | OUTPATIENT
Start: 2022-04-29 | End: 2022-07-28 | Stop reason: SDUPTHER

## 2022-06-03 DIAGNOSIS — F41.8 DEPRESSION WITH ANXIETY: ICD-10-CM

## 2022-06-03 RX ORDER — BUPROPION HYDROCHLORIDE 75 MG/1
75 TABLET ORAL 2 TIMES DAILY
Qty: 180 TABLET | Refills: 1 | Status: SHIPPED | OUTPATIENT
Start: 2022-06-03

## 2022-06-03 RX ORDER — BUPROPION HYDROCHLORIDE 75 MG/1
TABLET ORAL
Qty: 180 TABLET | Refills: 1 | OUTPATIENT
Start: 2022-06-03

## 2022-07-12 ENCOUNTER — TELEPHONE (OUTPATIENT)
Dept: GASTROENTEROLOGY | Facility: CLINIC | Age: 57
End: 2022-07-12

## 2022-07-12 NOTE — TELEPHONE ENCOUNTER
Left message to reschedule procedure with Dr Cristin Brambila to Dr Angelic Meza  Direct line given

## 2022-07-12 NOTE — TELEPHONE ENCOUNTER
Patients GI provider:  Dr Mildred Almendarez  Number to return call: [phone])    Reason for call: Pt calling to reschedule her 7/27 colonoscopy with Dr Mildred Almendarez  Wants to be rescheduled with Dr Maryam Mooney   had him and she states she seen him before  She has a celebration of life to go to  Thank you!     Scheduled procedure/appointment date if applicable: Apt/procedure 7/27/22

## 2022-07-14 NOTE — TELEPHONE ENCOUNTER
Spoke patient who would like to switch care to Dr Danial Loving  Rescheduled to 09/28/22 at [facility]

## 2022-07-28 DIAGNOSIS — F41.1 GAD (GENERALIZED ANXIETY DISORDER): ICD-10-CM

## 2022-07-28 RX ORDER — LORAZEPAM 1 MG/1
1 TABLET ORAL DAILY PRN
Qty: 30 TABLET | Refills: 2 | Status: SHIPPED | OUTPATIENT
Start: 2022-07-28 | End: 2022-10-26 | Stop reason: SDUPTHER

## 2022-07-28 NOTE — TELEPHONE ENCOUNTER
06/30/2022 04/29/2022 LORazepam (Tablet)  30 0 30 1 MG NA [address]37 [facility] #[id number]  Commercial Insurance 2 / 2 [place]

## 2022-08-12 ENCOUNTER — PROCEDURE VISIT (OUTPATIENT)
Dept: FAMILY MEDICINE CLINIC | Facility: CLINIC | Age: 57
End: 2022-08-12
Payer: COMMERCIAL

## 2022-08-12 VITALS
OXYGEN SATURATION: 96 % | BODY MASS INDEX: 25.67 KG/M2 | RESPIRATION RATE: 16 BRPM | HEIGHT: 62 IN | TEMPERATURE: 95.7 F | DIASTOLIC BLOOD PRESSURE: 82 MMHG | WEIGHT: 139.5 LBS | HEART RATE: 83 BPM | SYSTOLIC BLOOD PRESSURE: 124 MMHG

## 2022-08-12 DIAGNOSIS — M65.352 TRIGGER LITTLE FINGER OF LEFT HAND: ICD-10-CM

## 2022-08-12 DIAGNOSIS — M19.041 PRIMARY OSTEOARTHRITIS OF BOTH HANDS: ICD-10-CM

## 2022-08-12 DIAGNOSIS — M65.341 TRIGGER RING FINGER OF RIGHT HAND: Primary | ICD-10-CM

## 2022-08-12 DIAGNOSIS — M19.042 PRIMARY OSTEOARTHRITIS OF BOTH HANDS: ICD-10-CM

## 2022-08-12 PROCEDURE — 20550 NJX 1 TENDON SHEATH/LIGAMENT: CPT | Performed by: FAMILY MEDICINE

## 2022-08-12 RX ORDER — METHYLPREDNISOLONE ACETATE 40 MG/ML
20 INJECTION, SUSPENSION INTRA-ARTICULAR; INTRALESIONAL; INTRAMUSCULAR; SOFT TISSUE ONCE
Status: COMPLETED | OUTPATIENT
Start: 2022-08-12 | End: 2022-08-12

## 2022-08-12 RX ADMIN — METHYLPREDNISOLONE ACETATE 20 MG: 40 INJECTION, SUSPENSION INTRA-ARTICULAR; INTRALESIONAL; INTRAMUSCULAR; SOFT TISSUE at 13:30

## 2022-08-12 RX ADMIN — METHYLPREDNISOLONE ACETATE 20 MG: 40 INJECTION, SUSPENSION INTRA-ARTICULAR; INTRALESIONAL; INTRAMUSCULAR; SOFT TISSUE at 13:29

## 2022-08-12 NOTE — PROGRESS NOTES
Hand/upper extremity injection: R ring A1  Universal Protocol:  Consent: Verbal consent obtained  Risks and benefits: risks, benefits and alternatives were discussed  Consent given by: patient    Supporting Documentation  Indications: pain (Trigger finger R ring finger )   Procedure Details  Condition:trigger finger Location: ring finger - R ring A1   Preparation: Ethyl Alcohol   Needle size: 25 G  Ultrasound guidance: no  Approach: volar    Patient tolerance: patient tolerated the procedure well with no immediate complications  Dressing:  Sterile dressing applied    DepoMedrol 40 mg/ML-0 5 ML      Hand/upper extremity injection: L small A1  Universal Protocol:  Consent: Verbal consent obtained    Risks and benefits: risks, benefits and alternatives were discussed  Consent given by: patient    Supporting Documentation  Indications: pain (Trigger finger L 5th finger)   Procedure Details  Condition:trigger finger Location: small finger - L small A1   Needle size: 25 G  Ultrasound guidance: no  Approach: volar    Patient tolerance: patient tolerated the procedure well with no immediate complications  Dressing:  Sterile dressing applied    DepoMedrol 40 mg/ML-0 5 ML

## 2022-08-15 RX ORDER — IBUPROFEN 600 MG/1
600 TABLET ORAL 3 TIMES DAILY PRN
Qty: 60 TABLET | Refills: 2 | OUTPATIENT
Start: 2022-08-15

## 2022-08-29 DIAGNOSIS — M19.041 PRIMARY OSTEOARTHRITIS OF BOTH HANDS: ICD-10-CM

## 2022-08-29 DIAGNOSIS — M19.042 PRIMARY OSTEOARTHRITIS OF BOTH HANDS: ICD-10-CM

## 2022-08-29 RX ORDER — IBUPROFEN 600 MG/1
600 TABLET ORAL 3 TIMES DAILY PRN
Qty: 60 TABLET | Refills: 2 | Status: SHIPPED | OUTPATIENT
Start: 2022-08-29

## 2022-08-29 RX ORDER — IBUPROFEN 600 MG/1
TABLET ORAL
Qty: 60 TABLET | Refills: 2 | OUTPATIENT
Start: 2022-08-29

## 2022-09-28 ENCOUNTER — HOSPITAL ENCOUNTER (OUTPATIENT)
Dept: GASTROENTEROLOGY | Facility: AMBULARY SURGERY CENTER | Age: 57
Setting detail: OUTPATIENT SURGERY
Discharge: HOME | End: 2022-09-28
Attending: INTERNAL MEDICINE
Payer: COMMERCIAL

## 2022-09-28 ENCOUNTER — ANESTHESIA EVENT (OUTPATIENT)
Dept: GASTROENTEROLOGY | Facility: AMBULARY SURGERY CENTER | Age: 57
End: 2022-09-28

## 2022-09-28 ENCOUNTER — ANESTHESIA (OUTPATIENT)
Dept: GASTROENTEROLOGY | Facility: AMBULARY SURGERY CENTER | Age: 57
End: 2022-09-28

## 2022-09-28 VITALS
HEIGHT: 62 IN | TEMPERATURE: 96.7 F | DIASTOLIC BLOOD PRESSURE: 61 MMHG | RESPIRATION RATE: 20 BRPM | SYSTOLIC BLOOD PRESSURE: 101 MMHG | HEART RATE: 82 BPM | WEIGHT: 137 LBS | OXYGEN SATURATION: 98 % | BODY MASS INDEX: 25.21 KG/M2

## 2022-09-28 DIAGNOSIS — Z86.010 HISTORY OF COLON POLYPS: ICD-10-CM

## 2022-09-28 PROCEDURE — 45380 COLONOSCOPY AND BIOPSY: CPT | Performed by: INTERNAL MEDICINE

## 2022-09-28 PROCEDURE — 88305 TISSUE EXAM BY PATHOLOGIST: CPT | Performed by: STUDENT IN AN ORGANIZED HEALTH CARE EDUCATION/TRAINING PROGRAM

## 2022-09-28 PROCEDURE — 45385 COLONOSCOPY W/LESION REMOVAL: CPT | Performed by: INTERNAL MEDICINE

## 2022-09-28 RX ORDER — LIDOCAINE HYDROCHLORIDE 10 MG/ML
INJECTION, SOLUTION EPIDURAL; INFILTRATION; INTRACAUDAL; PERINEURAL AS NEEDED
Status: DISCONTINUED | OUTPATIENT
Start: 2022-09-28 | End: 2022-09-28

## 2022-09-28 RX ORDER — PROPOFOL 10 MG/ML
INJECTION, EMULSION INTRAVENOUS AS NEEDED
Status: DISCONTINUED | OUTPATIENT
Start: 2022-09-28 | End: 2022-09-28

## 2022-09-28 RX ORDER — SODIUM CHLORIDE, SODIUM LACTATE, POTASSIUM CHLORIDE, CALCIUM CHLORIDE 600; 310; 30; 20 MG/100ML; MG/100ML; MG/100ML; MG/100ML
INJECTION, SOLUTION INTRAVENOUS CONTINUOUS PRN
Status: DISCONTINUED | OUTPATIENT
Start: 2022-09-28 | End: 2022-09-28

## 2022-09-28 RX ORDER — SODIUM CHLORIDE, SODIUM LACTATE, POTASSIUM CHLORIDE, CALCIUM CHLORIDE 600; 310; 30; 20 MG/100ML; MG/100ML; MG/100ML; MG/100ML
125 INJECTION, SOLUTION INTRAVENOUS CONTINUOUS
Status: DISCONTINUED | OUTPATIENT
Start: 2022-09-28 | End: 2022-10-02 | Stop reason: HOSPADM

## 2022-09-28 RX ADMIN — SODIUM CHLORIDE, SODIUM LACTATE, POTASSIUM CHLORIDE, AND CALCIUM CHLORIDE: .6; .31; .03; .02 INJECTION, SOLUTION INTRAVENOUS at 08:22

## 2022-09-28 RX ADMIN — PROPOFOL 50 MG: 10 INJECTION, EMULSION INTRAVENOUS at 08:28

## 2022-09-28 RX ADMIN — LIDOCAINE HYDROCHLORIDE 50 MG: 10 INJECTION, SOLUTION EPIDURAL; INFILTRATION; INTRACAUDAL at 08:26

## 2022-09-28 RX ADMIN — PROPOFOL 50 MG: 10 INJECTION, EMULSION INTRAVENOUS at 08:30

## 2022-09-28 RX ADMIN — PROPOFOL 50 MG: 10 INJECTION, EMULSION INTRAVENOUS at 08:38

## 2022-09-28 RX ADMIN — PROPOFOL 100 MG: 10 INJECTION, EMULSION INTRAVENOUS at 08:26

## 2022-09-28 RX ADMIN — PROPOFOL 50 MG: 10 INJECTION, EMULSION INTRAVENOUS at 08:34

## 2022-09-28 NOTE — H&P
History and Physical - [facility]  Pj Alegria 59 y o  female MRN: [id number]    HPI: Pj Alegria is a 59y o  year old female who presents with hx of colon polyps  Review of Systems    Historical Information   Past Medical History:   Diagnosis Date    Acquired polycythemia 1/24/2015    Carpal tunnel syndrome on right     Last assessed 01/29/14    Ileus ([address] ) 2/14/2015    Lateral epicondylitis     Left- Last assessed 01/29/14    Midline cystocele 8/16/2019    Last Assessment & Plan:  We discussed the nature of pelvic organ prolapse, and reviewed basic alternatives including expectant management, pessary management, and surgical repair  We discussed the elective nature of prolapse treatment, which in nearly all cases can be postponed or avoided if symptoms are not bothersome or inhibiting to ones level of function and/or quality of life  The patient ha    Rectocele 8/16/2019    Last Assessment & Plan:  We discussed the nature of pelvic organ prolapse, and reviewed basic alternatives including expectant management, pessary management, and surgical repair  We discussed the elective nature of prolapse treatment, which in nearly all cases can be postponed or avoided if symptoms are not bothersome or inhibiting to ones level of function and/or quality of life  The patient ha    Uterovaginal prolapse 4/9/2019    Last Assessment & Plan:  We discussed the nature of pelvic organ prolapse, and reviewed basic alternatives including expectant management, pessary management, and surgical repair  We discussed the elective nature of prolapse treatment, which in nearly all cases can be postponed or avoided if symptoms are not bothersome or inhibiting to ones level of function and/or quality of life   The patient ha     Past Surgical History:   Procedure Laterality Date    HYSTERECTOMY       Social History   Social History     Substance and Sexual Activity   Alcohol Use Yes    Alcohol/week: 2 0 standard drinks    Types: 2 Cans of beer per week    Comment: few times a week     Social History     Substance and Sexual Activity   Drug Use No     Social History     Tobacco Use   Smoking Status Current Every Day Smoker    Packs/day: 0 50    Years: 35 00    Pack years: 17 50   Smokeless Tobacco Never Used   Tobacco Comment    7-10 daily     Family History   Problem Relation Age of Onset    Lung cancer Mother     Depression Father        Meds/Allergies     (Not in a hospital admission)      No Known Allergies    Objective     /79   Pulse 90   Temp 97 8 °F (36 6 °C) (Temporal)   Resp 18   Ht 5' 2" (1 575 m)   Wt 62 1 kg (137 lb)   SpO2 97%   BMI 25 06 kg/m²       PHYSICAL EXAM    Gen: NAD  CV: RRR  CHEST: Clear  ABD: soft, NT/ND  EXT: no edema  Neuro: AAO      ASSESSMENT/PLAN:  This is a 59y o  year old female here for hx of colon polyps         PLAN:   Procedure: colonoscopy

## 2022-09-28 NOTE — ANESTHESIA POSTPROCEDURE EVALUATION
Post-Op Assessment Note    CV Status:  Stable  Pain Score: 0    Pain management: adequate     Mental Status:  Arousable   Hydration Status:  Stable   PONV Controlled:  None   Airway Patency:  Patent      Post Op Vitals Reviewed: Yes      Staff: Anesthesiologist, CRNA         No complications documented      BP   105/61   Temp 98   Pulse 61   Resp 16   SpO2 99

## 2022-09-28 NOTE — ANESTHESIA PREPROCEDURE EVALUATION
Procedure:  COLONOSCOPY     - denies any chest pain, palpitations, shortness of breath, syncope, lightheadedness, seizures   - denies any recent infectious symptoms such as fevers, chills, cough   - denies taking any anticoagulation medications or any issues with bleeding, bruising, clotting      Relevant Problems   ANESTHESIA (within normal limits)      CARDIO (within normal limits)      ENDO (within normal limits)      GI/HEPATIC (within normal limits)      /RENAL (within normal limits)      GYN (within normal limits)      HEMATOLOGY (within normal limits)      MUSCULOSKELETAL   (+) Primary osteoarthritis of both hands      NEURO/PSYCH   (+) Anxiety   (+) History of colon polyps      PULMONARY (within normal limits)      Lab Results   Component Value Date    WBC 14 93 (H) 01/08/2015    HGB 18 6 (H) 01/08/2015    HCT 56 5 (H) 01/08/2015    MCV 99 (H) 01/08/2015     01/08/2015     Lab Results   Component Value Date     01/08/2015    K 4 2 01/08/2015     01/08/2015    CO2 30 01/08/2015    ANIONGAP 3 (L) 01/08/2015    BUN 10 01/08/2015    CREATININE 0 55 (L) 01/08/2015    CALCIUM 9 4 01/08/2015    AST 24 01/08/2015    ALT 39 01/08/2015    ALKPHOS 77 01/08/2015    PROT 7 8 01/08/2015    BILITOT 0 5 01/08/2015       Physical Exam    Airway    Mallampati score: I  TM Distance: >3 FB  Neck ROM: full     Dental   No notable dental hx     Cardiovascular  Rhythm: regular, Rate: normal, Cardiovascular exam normal    Pulmonary  Pulmonary exam normal Breath sounds clear to auscultation,     Other Findings        Anesthesia Plan  ASA Score- 2     Anesthesia Type- IV sedation with anesthesia with ASA Monitors  Additional Monitors:   Airway Plan:           Plan Factors-Exercise tolerance (METS): >4 METS  Chart reviewed  EKG reviewed  Imaging results reviewed  Existing labs reviewed  Patient summary reviewed  Patient is not a current smoker  Patient did not smoke on day of surgery      Obstructive sleep apnea risk education given perioperatively  Induction- intravenous  Postoperative Plan-     Informed Consent- Anesthetic plan and risks discussed with patient  I personally reviewed this patient with the CRNA  Discussed and agreed on the Anesthesia Plan with the MANDO Jackson

## 2022-10-03 ENCOUNTER — TELEPHONE (OUTPATIENT)
Dept: FAMILY MEDICINE CLINIC | Facility: CLINIC | Age: 57
End: 2022-10-03

## 2022-10-03 DIAGNOSIS — H92.01 RIGHT EAR PAIN: Primary | ICD-10-CM

## 2022-10-03 RX ORDER — HYDROCORTISONE AND ACETIC ACID 20.75; 10.375 MG/ML; MG/ML
3 SOLUTION AURICULAR (OTIC) 4 TIMES DAILY
Qty: 10 ML | Refills: 0 | Status: SHIPPED | OUTPATIENT
Start: 2022-10-03

## 2022-10-03 NOTE — TELEPHONE ENCOUNTER
Just spoke with patient & she was very upset that she could not get medication  Patient refused to schedule appointment  Recommended [facility]  Patient started  screaming in the phone & hung up

## 2022-10-03 NOTE — TELEPHONE ENCOUNTER
Patient informed, she is flying to [place] in the morning and is requesting antibiotic or ear drops to prevent flying issues

## 2022-10-03 NOTE — TELEPHONE ENCOUNTER
This is very difficult as I have no idea what I am treating without evaluating the patient  Ear pain sounds to be more eustachian tube dysfunction  I recommend Flonase for that, but will send a swimmer's ear drop

## 2022-10-03 NOTE — TELEPHONE ENCOUNTER
Pt called stating she started last evening with right ear pain and a runny nose  Asked pt if she tested for covid and states, "it's not covid"  Pt is asking if you could call in an abx for her  She uses the [facility] pharmacy on [address]

## 2022-10-03 NOTE — TELEPHONE ENCOUNTER
One day of symptoms would not require treatment with antibiotic as symptoms likely viral and not bacterial     Recommend Tylenol and ibuprofen for ear pain, Flonase nasal spray for runny nose as well as nasal saline    Encourage rest and hydration

## 2022-10-03 NOTE — TELEPHONE ENCOUNTER
Can patient please schedule visit for evaluation so her ear can be examined to determine the best treatment

## 2022-10-03 NOTE — TELEPHONE ENCOUNTER
Patient informed, stated that she has had right ear pain x 2 weeks  She was treating it as if she had swimmers ear with OTC ear drops  She states that it feels as if she has ear infection, she has had them in the past  No loss of hearing

## 2022-10-04 PROCEDURE — 88305 TISSUE EXAM BY PATHOLOGIST: CPT | Performed by: STUDENT IN AN ORGANIZED HEALTH CARE EDUCATION/TRAINING PROGRAM

## 2022-10-26 DIAGNOSIS — F41.1 GAD (GENERALIZED ANXIETY DISORDER): ICD-10-CM

## 2022-10-26 RX ORDER — LORAZEPAM 1 MG/1
1 TABLET ORAL DAILY PRN
Qty: 30 TABLET | Refills: 2 | Status: SHIPPED | OUTPATIENT
Start: 2022-10-26

## 2022-10-26 NOTE — TELEPHONE ENCOUNTER
1  [id number] 09/29/2022 07/28/2022 LORazepam (Tablet)  30 0 30 1 MG NA [address] [id number]  [employer] 2 / 2 [place]

## 2022-12-02 DIAGNOSIS — M19.041 PRIMARY OSTEOARTHRITIS OF BOTH HANDS: ICD-10-CM

## 2022-12-02 DIAGNOSIS — M19.042 PRIMARY OSTEOARTHRITIS OF BOTH HANDS: ICD-10-CM

## 2022-12-02 DIAGNOSIS — F41.8 DEPRESSION WITH ANXIETY: ICD-10-CM

## 2022-12-02 RX ORDER — IBUPROFEN 600 MG/1
600 TABLET ORAL 3 TIMES DAILY PRN
Qty: 60 TABLET | Refills: 2 | Status: SHIPPED | OUTPATIENT
Start: 2022-12-02

## 2022-12-02 RX ORDER — BUPROPION HYDROCHLORIDE 75 MG/1
TABLET ORAL
Qty: 180 TABLET | Refills: 1 | Status: SHIPPED | OUTPATIENT
Start: 2022-12-02

## 2023-11-10 NOTE — TELEPHONE ENCOUNTER
Patient is requesting a refill on MATAXALONE 800 mg  The last refill was on 11/10/17  She said she takes this very rarely for chronic  left shoulder pain which is at it's worst with the cold  She just finished the last refill  Can this be refilled without seeing her? [facility]  F: s/p 1L NS  E: replete as needed  N: regular    DVT: lovenox